# Patient Record
Sex: FEMALE | Race: BLACK OR AFRICAN AMERICAN | NOT HISPANIC OR LATINO | Employment: PART TIME | URBAN - METROPOLITAN AREA
[De-identification: names, ages, dates, MRNs, and addresses within clinical notes are randomized per-mention and may not be internally consistent; named-entity substitution may affect disease eponyms.]

---

## 2020-07-09 ENCOUNTER — LAB VISIT (OUTPATIENT)
Dept: PRIMARY CARE CLINIC | Facility: OTHER | Age: 50
End: 2020-07-09
Attending: INTERNAL MEDICINE
Payer: MEDICAID

## 2020-07-09 DIAGNOSIS — Z03.818 ENCOUNTER FOR OBSERVATION FOR SUSPECTED EXPOSURE TO OTHER BIOLOGICAL AGENTS RULED OUT: ICD-10-CM

## 2020-07-09 PROCEDURE — U0003 INFECTIOUS AGENT DETECTION BY NUCLEIC ACID (DNA OR RNA); SEVERE ACUTE RESPIRATORY SYNDROME CORONAVIRUS 2 (SARS-COV-2) (CORONAVIRUS DISEASE [COVID-19]), AMPLIFIED PROBE TECHNIQUE, MAKING USE OF HIGH THROUGHPUT TECHNOLOGIES AS DESCRIBED BY CMS-2020-01-R: HCPCS

## 2020-07-12 LAB — SARS-COV-2 RNA RESP QL NAA+PROBE: NEGATIVE

## 2022-10-25 ENCOUNTER — OFFICE VISIT (OUTPATIENT)
Dept: URGENT CARE | Facility: CLINIC | Age: 52
End: 2022-10-25
Payer: MEDICAID

## 2022-10-25 ENCOUNTER — HOSPITAL ENCOUNTER (OUTPATIENT)
Dept: RADIOLOGY | Facility: HOSPITAL | Age: 52
Discharge: HOME | End: 2022-10-25
Attending: NURSE PRACTITIONER
Payer: MEDICAID

## 2022-10-25 VITALS
WEIGHT: 197.56 LBS | TEMPERATURE: 98 F | BODY MASS INDEX: 35 KG/M2 | RESPIRATION RATE: 20 BRPM | DIASTOLIC BLOOD PRESSURE: 90 MMHG | HEART RATE: 60 BPM | OXYGEN SATURATION: 99 % | SYSTOLIC BLOOD PRESSURE: 171 MMHG | HEIGHT: 63 IN
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
## 2022-10-25 DIAGNOSIS — R07.89 CHEST WALL PAIN: Primary | ICD-10-CM

## 2022-10-25 PROBLEM — E11.9 TYPE 2 DIABETES MELLITUS, WITHOUT LONG-TERM CURRENT USE OF INSULIN: Status: ACTIVE | Noted: 2022-10-25

## 2022-10-25 PROBLEM — G56.03 CARPAL TUNNEL SYNDROME, BILATERAL: Status: ACTIVE | Noted: 2021-04-13

## 2022-10-25 PROBLEM — I10 HTN (HYPERTENSION): Status: ACTIVE | Noted: 2022-10-25

## 2022-10-25 PROBLEM — E78.5 HYPERLIPIDEMIA: Status: ACTIVE | Noted: 2022-10-25

## 2022-10-25 PROCEDURE — 99215 OFFICE O/P EST HI 40 MIN: CPT | Mod: PBBFAC | Performed by: NURSE PRACTITIONER

## 2022-10-25 PROCEDURE — 99214 OFFICE O/P EST MOD 30 MIN: CPT | Mod: S$PBB,,, | Performed by: NURSE PRACTITIONER

## 2022-10-25 PROCEDURE — 71101 X-RAY EXAM UNILAT RIBS/CHEST: CPT | Mod: TC,RT

## 2022-10-25 PROCEDURE — 99214 PR OFFICE/OUTPT VISIT, EST, LEVL IV, 30-39 MIN: ICD-10-PCS | Mod: S$PBB,,, | Performed by: NURSE PRACTITIONER

## 2022-10-25 RX ORDER — TRAMADOL HYDROCHLORIDE 50 MG/1
25 TABLET ORAL EVERY 12 HOURS PRN
Qty: 14 TABLET | Refills: 0 | Status: SHIPPED | OUTPATIENT
Start: 2022-10-25 | End: 2022-11-01

## 2022-10-25 RX ORDER — ATORVASTATIN CALCIUM 40 MG/1
40 TABLET, FILM COATED ORAL NIGHTLY
COMMUNITY
Start: 2022-10-06 | End: 2023-05-02 | Stop reason: SDUPTHER

## 2022-10-25 RX ORDER — DEXTROSE 4 G
TABLET,CHEWABLE ORAL
COMMUNITY
End: 2024-01-19 | Stop reason: SDUPTHER

## 2022-10-25 RX ORDER — DICLOFENAC SODIUM 50 MG/1
50 TABLET, DELAYED RELEASE ORAL 2 TIMES DAILY PRN
Qty: 20 TABLET | Refills: 0 | Status: SHIPPED | OUTPATIENT
Start: 2022-10-25 | End: 2022-11-04

## 2022-10-25 RX ORDER — AMLODIPINE BESYLATE 10 MG/1
10 TABLET ORAL DAILY
COMMUNITY
Start: 2022-10-06 | End: 2023-05-02 | Stop reason: SDUPTHER

## 2022-10-25 RX ORDER — METFORMIN HYDROCHLORIDE 500 MG/1
500 TABLET ORAL 2 TIMES DAILY WITH MEALS
COMMUNITY
End: 2023-05-02 | Stop reason: SDUPTHER

## 2022-10-25 NOTE — PROGRESS NOTES
"Subjective:       Patient ID: Traci Petersen is a 52 y.o. female.    Vitals:  height is 5' 2.99" (1.6 m) and weight is 89.6 kg (197 lb 8.5 oz). Her temperature is 98.1 °F (36.7 °C). Her blood pressure is 171/90 (abnormal) and her pulse is 60. Her respiration is 20 and oxygen saturation is 99%.     Chief Complaint: Fall (On sat, c/o rt rib pain) and Rib Injury    HPI as stated in CC. States fell on a sidewalk, on concrete.     Skin:  Negative for erythema.     Objective:      Physical Exam   Constitutional: She is oriented to person, place, and time.      Comments:Appears to be intoxicated.   obesity  HENT:   Head: Normocephalic and atraumatic.   Nose: No rhinorrhea or congestion.   Eyes: Right conjunctiva is injected. Left conjunctiva is injected.      Comments: Drowsy-appearing.   Neck: Neck supple.   Pulmonary/Chest: Effort normal and breath sounds normal. She exhibits tenderness.   Abdominal: Normal appearance.   Musculoskeletal:         General: Tenderness and signs of injury present. No swelling or deformity.   Neurological: She is oriented to person, place, and time.   Skin: No bruising and No erythema   Psychiatric: Her speech is slurred. She is slowed. She exhibits abnormal recent memory.      Comments: Same questions have to be asked multiple times to elicit a response. The patient told a very confusing story about "the clinic on [address]" and "the ER told me to go to 3 and then go to 4 and then go to urgent care." She told this story 3 times. She had no head injury during her fall.  She is inattentive.   Vitals reviewed.      Assessment:       1. Chest wall pain    2. Fall, initial encounter        XR RIB RIGHT W/ PA CHEST    Result Date: 10/25/2022  EXAMINATION XR RIBS MIN 3 VIEWS W/ PA CHEST RIGHT   CLINICAL HISTORY fall 3 days ago onto right side of chest.; Other chest pain   TECHNIQUE A total of 4 images of the ribs, submitted with PA view of the chest.   COMPARISON None available at the " time of initial interpretation.   FINDINGS Lines/tubes/devices: none present The cardiomediastinal silhouette and central pulmonary vasculature are unremarkable.  The trachea is midline. There is no lobar consolidation, pleural effusion, or pneumothorax. Detection of subtle/nondisplaced rib fracture is limited by imaging technique and the absence of radiopaque marker to indicate area of pain localization.  Within limitations, no convincing displaced rib abnormality is appreciated. The remaining osseous structures are grossly intact. IMPRESSION 1. No acute cardiopulmonary abnormality. 2. No displaced rib fracture.   Electronically signed by: Juanito Park   Date:    10/25/2022   Time:    15:39       Plan:         Chest wall pain  -     XR RIB RIGHT W/ PA CHEST    Fall, initial encounter  -     XR RIB RIGHT W/ PA CHEST     You do not have a broken rib/s.    Try to do 10 deep breaths at a time, once per hour while you are awake.    You can use a small pillow or stuffed animal to splint your ribs if you need to cough.    Take Tylenol every day if you need it.    Tramadol is a stronger pain medication you can take, to help you sleep at night.  Start with 1/2 a tablet and see if this relieves your pain. If not, take a whole tablet. Do not take this medication on an empty stomach. Take with food and water.    If at any time during the next 1 to 6 weeks you get a productive cough, upper back pain, fevers, fatigue, shortness of breath: please go in to be seen with your regular provider, at an Urgent Care or the ER.

## 2022-10-25 NOTE — PATIENT INSTRUCTIONS
You do not have a broken rib/s.    Try to do 10 deep breaths at a time, once per hour while you are awake.    You can use a small pillow or stuffed animal to splint your ribs if you need to cough.    Take Tylenol every day if you need it.    Tramadol is a stronger pain medication you can take, to help you sleep at night.  Start with 1/2 a tablet and see if this relieves your pain. If not, take a whole tablet. Do not take this medication on an empty stomach. Take with food and water.    If at any time during the next 1 to 6 weeks you get a productive cough, upper back pain, fevers, fatigue, shortness of breath: please go in to be seen with your regular provider, at an Urgent Care or the ER.

## 2022-10-25 NOTE — LETTER
October 25, 2022      [facility]  [address] [address]  Phone: [phone]       Patient: Traci Petersen   YOB: 1970  Date of Visit: 10/25/2022    To Whom It May Concern:    Davide Petersen  was at [facility] on 10/25/2022. The patient may return to work/school on 10/27/22 with no restrictions. If you have any questions or concerns, or if I can be of further assistance, please do not hesitate to contact me.    Sincerely,    CRISTINO Eugene

## 2022-11-02 ENCOUNTER — TELEPHONE (OUTPATIENT)
Dept: URGENT CARE | Facility: CLINIC | Age: 52
End: 2022-11-02
Payer: MEDICAID

## 2022-11-02 NOTE — TELEPHONE ENCOUNTER
----- Message from Jeannette Mccurdy sent at 10/26/2022 10:31 AM CDT -----  Regarding: Need Med Auth  Patient called stating that she needed a pre authorization for medication prescribed by [facility].  [phone]

## 2023-01-19 LAB — BCS RECOMMENDATION EXT: NORMAL

## 2023-03-29 ENCOUNTER — OFFICE VISIT (OUTPATIENT)
Dept: URGENT CARE | Facility: CLINIC | Age: 53
End: 2023-03-29
Payer: MEDICAID

## 2023-03-29 VITALS
SYSTOLIC BLOOD PRESSURE: 167 MMHG | WEIGHT: 187.38 LBS | RESPIRATION RATE: 16 BRPM | TEMPERATURE: 98 F | HEIGHT: 63 IN | OXYGEN SATURATION: 100 % | BODY MASS INDEX: 33.2 KG/M2 | DIASTOLIC BLOOD PRESSURE: 87 MMHG | HEART RATE: 63 BPM

## 2023-03-29 DIAGNOSIS — G56.01 CARPAL TUNNEL SYNDROME OF RIGHT WRIST: ICD-10-CM

## 2023-03-29 DIAGNOSIS — Z76.89 REFERRAL OF PATIENT: Primary | ICD-10-CM

## 2023-03-29 PROCEDURE — 99213 PR OFFICE/OUTPT VISIT, EST, LEVL III, 20-29 MIN: ICD-10-PCS | Mod: S$PBB,,,

## 2023-03-29 PROCEDURE — 99215 OFFICE O/P EST HI 40 MIN: CPT | Mod: PBBFAC

## 2023-03-29 PROCEDURE — 99213 OFFICE O/P EST LOW 20 MIN: CPT | Mod: S$PBB,,,

## 2023-03-29 RX ORDER — SERTRALINE HYDROCHLORIDE 50 MG/1
50 TABLET, FILM COATED ORAL
COMMUNITY
Start: 2023-02-08 | End: 2023-05-02

## 2023-03-29 RX ORDER — DICLOFENAC SODIUM 75 MG/1
75 TABLET, DELAYED RELEASE ORAL 2 TIMES DAILY
Qty: 30 TABLET | Refills: 0 | Status: SHIPPED | OUTPATIENT
Start: 2023-03-29 | End: 2023-05-02

## 2023-03-29 NOTE — PROGRESS NOTES
"Subjective:      Patient ID: Traci Petersen is a 52 y.o. female.    Vitals:  height is 5' 2.99" (1.6 m) and weight is 85 kg (187 lb 6.4 oz). Her temperature is 97.7 °F (36.5 °C). Her blood pressure is 167/87 (abnormal) and her pulse is 63. Her respiration is 16 and oxygen saturation is 100%.     Chief Complaint: Rt hand/Lt foot pain (Hx of carpal tunnel, needs new PCP. Foot pain x ~ 1 week, no injury.) and Referral (Needs referral for PCP)    Rt hand pain from carpal tunnel. Hx of sx for carpal tunnel, currently wearing a brace. L foot pain. Denies injury. States worse in the morning when getting out of bed. Needs a referral.      Constitution: Negative.   HENT: Negative.     Neck: neck negative.   Cardiovascular: Negative.    Eyes: Negative.    Respiratory: Negative.     Gastrointestinal: Negative.    Genitourinary: Negative.    Musculoskeletal:  Positive for pain.   Skin: Negative.    Neurological: Negative.     Objective:     Physical Exam   HENT:   Head: Normocephalic.   Mouth/Throat: Mucous membranes are moist. Oropharynx is clear.   Eyes: Pupils are equal, round, and reactive to light.   Cardiovascular: Normal rate and normal pulses.   Pulmonary/Chest: Effort normal.   Abdominal: Normal appearance. Soft.   Musculoskeletal: Normal range of motion.         General: Tenderness present. Normal range of motion.      Right hand: She exhibits tenderness.      Left hand: Normal.      Right foot: Normal.      Left foot: Tenderness present.        Feet:    Neurological: She is alert.   Skin: Skin is warm and dry.   Vitals reviewed.    Assessment:     1. Referral of patient    2. Carpal tunnel syndrome of right wrist        Plan:       Referral of patient  -     Ambulatory referral/consult to Family Practice    Carpal tunnel syndrome of right wrist  -     diclofenac (VOLTAREN) 75 MG EC tablet; Take 1 tablet (75 mg total) by mouth 2 (two) times daily.  Dispense: 30 tablet; Refill: 0    ER precautions given, " patient verbalized understanding.     Please see provided patient education for guidance.    Follow up with PCP or return to clinic if symptoms worsen or do not improve.

## 2023-05-02 ENCOUNTER — HOSPITAL ENCOUNTER (OUTPATIENT)
Dept: RADIOLOGY | Facility: HOSPITAL | Age: 53
Discharge: HOME | End: 2023-05-02
Attending: NURSE PRACTITIONER
Payer: MEDICAID

## 2023-05-02 ENCOUNTER — OFFICE VISIT (OUTPATIENT)
Dept: FAMILY MEDICINE | Facility: CLINIC | Age: 53
End: 2023-05-02
Payer: MEDICAID

## 2023-05-02 VITALS
WEIGHT: 185.5 LBS | TEMPERATURE: 98 F | RESPIRATION RATE: 20 BRPM | HEART RATE: 60 BPM | HEIGHT: 62 IN | BODY MASS INDEX: 34.14 KG/M2 | OXYGEN SATURATION: 99 % | DIASTOLIC BLOOD PRESSURE: 78 MMHG | SYSTOLIC BLOOD PRESSURE: 148 MMHG

## 2023-05-02 DIAGNOSIS — I10 HYPERTENSION, UNSPECIFIED TYPE: Primary | ICD-10-CM

## 2023-05-02 DIAGNOSIS — E11.69 TYPE 2 DIABETES MELLITUS WITH OTHER SPECIFIED COMPLICATION, WITHOUT LONG-TERM CURRENT USE OF INSULIN: ICD-10-CM

## 2023-05-02 DIAGNOSIS — G47.00 INSOMNIA, UNSPECIFIED TYPE: ICD-10-CM

## 2023-05-02 DIAGNOSIS — Z12.11 ENCOUNTER FOR SCREENING FOR MALIGNANT NEOPLASM OF COLON: ICD-10-CM

## 2023-05-02 DIAGNOSIS — E78.5 HYPERLIPIDEMIA, UNSPECIFIED HYPERLIPIDEMIA TYPE: ICD-10-CM

## 2023-05-02 DIAGNOSIS — F41.9 ANXIETY: ICD-10-CM

## 2023-05-02 DIAGNOSIS — G56.03 CARPAL TUNNEL SYNDROME, BILATERAL: ICD-10-CM

## 2023-05-02 DIAGNOSIS — Z12.31 ENCOUNTER FOR SCREENING MAMMOGRAM FOR MALIGNANT NEOPLASM OF BREAST: ICD-10-CM

## 2023-05-02 LAB
ALBUMIN SERPL-MCNC: 3.9 G/DL (ref 3.5–5)
ALBUMIN/GLOB SERPL: 1.1 RATIO (ref 1.1–2)
ALP SERPL-CCNC: 74 UNIT/L (ref 40–150)
ALT SERPL-CCNC: 12 UNIT/L (ref 0–55)
APPEARANCE UR: CLEAR
AST SERPL-CCNC: 16 UNIT/L (ref 5–34)
BACTERIA #/AREA URNS AUTO: ABNORMAL /HPF
BASOPHILS # BLD AUTO: 0.03 X10(3)/MCL
BASOPHILS NFR BLD AUTO: 0.5 %
BILIRUB UR QL STRIP.AUTO: NEGATIVE MG/DL
BILIRUBIN DIRECT+TOT PNL SERPL-MCNC: 0.8 MG/DL
BUN SERPL-MCNC: 9.3 MG/DL (ref 9.8–20.1)
CALCIUM SERPL-MCNC: 9.3 MG/DL (ref 8.4–10.2)
CHLORIDE SERPL-SCNC: 105 MMOL/L (ref 98–107)
CHOLEST SERPL-MCNC: 186 MG/DL
CHOLEST/HDLC SERPL: 3 {RATIO} (ref 0–5)
CO2 SERPL-SCNC: 23 MMOL/L (ref 22–29)
COLOR UR AUTO: ABNORMAL
CREAT SERPL-MCNC: 0.74 MG/DL (ref 0.55–1.02)
CREAT UR-MCNC: 169.1 MG/DL (ref 47–110)
DEPRECATED CALCIDIOL+CALCIFEROL SERPL-MC: 14.9 NG/ML (ref 30–80)
EOSINOPHIL # BLD AUTO: 0.08 X10(3)/MCL (ref 0–0.9)
EOSINOPHIL NFR BLD AUTO: 1.4 %
ERYTHROCYTE [DISTWIDTH] IN BLOOD BY AUTOMATED COUNT: 12.3 % (ref 11.5–17)
EST. AVERAGE GLUCOSE BLD GHB EST-MCNC: 125.5 MG/DL
GFR SERPLBLD CREATININE-BSD FMLA CKD-EPI: >60 MLS/MIN/1.73/M2
GLOBULIN SER-MCNC: 3.7 GM/DL (ref 2.4–3.5)
GLUCOSE SERPL-MCNC: 143 MG/DL (ref 74–100)
GLUCOSE UR QL STRIP.AUTO: NORMAL MG/DL
HAV IGM SERPL QL IA: NONREACTIVE
HBA1C MFR BLD: 6 %
HBV CORE IGM SERPL QL IA: NONREACTIVE
HBV SURFACE AG SERPL QL IA: NONREACTIVE
HCT VFR BLD AUTO: 42.9 % (ref 37–47)
HCV AB SERPL QL IA: NONREACTIVE
HDLC SERPL-MCNC: 66 MG/DL (ref 35–60)
HGB BLD-MCNC: 13.4 G/DL (ref 12–16)
HIV 1+2 AB+HIV1 P24 AG SERPL QL IA: NONREACTIVE
HYALINE CASTS #/AREA URNS LPF: ABNORMAL /LPF
IMM GRANULOCYTES # BLD AUTO: 0 X10(3)/MCL (ref 0–0.04)
IMM GRANULOCYTES NFR BLD AUTO: 0 %
KETONES UR QL STRIP.AUTO: NEGATIVE MG/DL
LDLC SERPL CALC-MCNC: 99 MG/DL (ref 50–140)
LEUKOCYTE ESTERASE UR QL STRIP.AUTO: 75 UNIT/L
LYMPHOCYTES # BLD AUTO: 3.27 X10(3)/MCL (ref 0.6–4.6)
LYMPHOCYTES NFR BLD AUTO: 57.5 %
MCH RBC QN AUTO: 26.3 PG (ref 27–31)
MCHC RBC AUTO-ENTMCNC: 31.2 G/DL (ref 33–36)
MCV RBC AUTO: 84.1 FL (ref 80–94)
MICROALBUMIN UR-MCNC: 17.2 UG/ML
MICROALBUMIN/CREAT RATIO PNL UR: 10.2 MG/GM CR (ref 0–30)
MONOCYTES # BLD AUTO: 0.31 X10(3)/MCL (ref 0.1–1.3)
MONOCYTES NFR BLD AUTO: 5.4 %
MUCOUS THREADS URNS QL MICRO: ABNORMAL /LPF
NEUTROPHILS # BLD AUTO: 2 X10(3)/MCL (ref 2.1–9.2)
NEUTROPHILS NFR BLD AUTO: 35.2 %
NITRITE UR QL STRIP.AUTO: NEGATIVE
NRBC BLD AUTO-RTO: 0 %
PH UR STRIP.AUTO: 6 [PH]
PLATELET # BLD AUTO: 395 X10(3)/MCL (ref 130–400)
PMV BLD AUTO: 9.2 FL (ref 7.4–10.4)
POTASSIUM SERPL-SCNC: 3.7 MMOL/L (ref 3.5–5.1)
PROT SERPL-MCNC: 7.6 GM/DL (ref 6.4–8.3)
PROT UR QL STRIP.AUTO: ABNORMAL MG/DL
RBC # BLD AUTO: 5.1 X10(6)/MCL (ref 4.2–5.4)
RBC #/AREA URNS AUTO: ABNORMAL /HPF
RBC UR QL AUTO: ABNORMAL UNIT/L
SODIUM SERPL-SCNC: 138 MMOL/L (ref 136–145)
SP GR UR STRIP.AUTO: 1.02
SQUAMOUS #/AREA URNS LPF: ABNORMAL /HPF
T PALLIDUM AB SER QL: NONREACTIVE
T4 FREE SERPL-MCNC: 0.92 NG/DL (ref 0.7–1.48)
TRIGL SERPL-MCNC: 103 MG/DL (ref 37–140)
TSH SERPL-ACNC: 2.54 UIU/ML (ref 0.35–4.94)
UROBILINOGEN UR STRIP-ACNC: NORMAL MG/DL
VIT B12 SERPL-MCNC: 417 PG/ML (ref 213–816)
VLDLC SERPL CALC-MCNC: 21 MG/DL
WBC # SPEC AUTO: 5.69 X10(3)/MCL (ref 4.5–11.5)
WBC #/AREA URNS AUTO: ABNORMAL /HPF

## 2023-05-02 PROCEDURE — 3078F PR MOST RECENT DIASTOLIC BLOOD PRESSURE < 80 MM HG: ICD-10-PCS | Mod: CPTII,,, | Performed by: NURSE PRACTITIONER

## 2023-05-02 PROCEDURE — 80053 COMPREHEN METABOLIC PANEL: CPT | Performed by: NURSE PRACTITIONER

## 2023-05-02 PROCEDURE — 82306 VITAMIN D 25 HYDROXY: CPT | Performed by: NURSE PRACTITIONER

## 2023-05-02 PROCEDURE — 81001 URINALYSIS AUTO W/SCOPE: CPT | Performed by: NURSE PRACTITIONER

## 2023-05-02 PROCEDURE — 84439 ASSAY OF FREE THYROXINE: CPT | Performed by: NURSE PRACTITIONER

## 2023-05-02 PROCEDURE — 84443 ASSAY THYROID STIM HORMONE: CPT | Performed by: NURSE PRACTITIONER

## 2023-05-02 PROCEDURE — 36415 COLL VENOUS BLD VENIPUNCTURE: CPT | Performed by: NURSE PRACTITIONER

## 2023-05-02 PROCEDURE — 1160F PR REVIEW ALL MEDS BY PRESCRIBER/CLIN PHARMACIST DOCUMENTED: ICD-10-PCS | Mod: CPTII,,, | Performed by: NURSE PRACTITIONER

## 2023-05-02 PROCEDURE — 85025 COMPLETE CBC W/AUTO DIFF WBC: CPT | Performed by: NURSE PRACTITIONER

## 2023-05-02 PROCEDURE — 3077F SYST BP >= 140 MM HG: CPT | Mod: CPTII,,, | Performed by: NURSE PRACTITIONER

## 2023-05-02 PROCEDURE — 99204 OFFICE O/P NEW MOD 45 MIN: CPT | Mod: S$PBB,,, | Performed by: NURSE PRACTITIONER

## 2023-05-02 PROCEDURE — 99215 OFFICE O/P EST HI 40 MIN: CPT | Mod: PBBFAC,PN | Performed by: NURSE PRACTITIONER

## 2023-05-02 PROCEDURE — 82607 VITAMIN B-12: CPT | Performed by: NURSE PRACTITIONER

## 2023-05-02 PROCEDURE — 1159F PR MEDICATION LIST DOCUMENTED IN MEDICAL RECORD: ICD-10-PCS | Mod: CPTII,,, | Performed by: NURSE PRACTITIONER

## 2023-05-02 PROCEDURE — 80061 LIPID PANEL: CPT | Performed by: NURSE PRACTITIONER

## 2023-05-02 PROCEDURE — 1160F RVW MEDS BY RX/DR IN RCRD: CPT | Mod: CPTII,,, | Performed by: NURSE PRACTITIONER

## 2023-05-02 PROCEDURE — 99204 PR OFFICE/OUTPT VISIT, NEW, LEVL IV, 45-59 MIN: ICD-10-PCS | Mod: S$PBB,,, | Performed by: NURSE PRACTITIONER

## 2023-05-02 PROCEDURE — 3078F DIAST BP <80 MM HG: CPT | Mod: CPTII,,, | Performed by: NURSE PRACTITIONER

## 2023-05-02 PROCEDURE — 83036 HEMOGLOBIN GLYCOSYLATED A1C: CPT | Performed by: NURSE PRACTITIONER

## 2023-05-02 PROCEDURE — 3008F BODY MASS INDEX DOCD: CPT | Mod: CPTII,,, | Performed by: NURSE PRACTITIONER

## 2023-05-02 PROCEDURE — 3077F PR MOST RECENT SYSTOLIC BLOOD PRESSURE >= 140 MM HG: ICD-10-PCS | Mod: CPTII,,, | Performed by: NURSE PRACTITIONER

## 2023-05-02 PROCEDURE — 87389 HIV-1 AG W/HIV-1&-2 AB AG IA: CPT | Performed by: NURSE PRACTITIONER

## 2023-05-02 PROCEDURE — 82043 UR ALBUMIN QUANTITATIVE: CPT | Performed by: NURSE PRACTITIONER

## 2023-05-02 PROCEDURE — 80074 ACUTE HEPATITIS PANEL: CPT | Performed by: NURSE PRACTITIONER

## 2023-05-02 PROCEDURE — 3008F PR BODY MASS INDEX (BMI) DOCUMENTED: ICD-10-PCS | Mod: CPTII,,, | Performed by: NURSE PRACTITIONER

## 2023-05-02 PROCEDURE — 73110 X-RAY EXAM OF WRIST: CPT | Mod: TC,PN,LT

## 2023-05-02 PROCEDURE — 1159F MED LIST DOCD IN RCRD: CPT | Mod: CPTII,,, | Performed by: NURSE PRACTITIONER

## 2023-05-02 PROCEDURE — 86780 TREPONEMA PALLIDUM: CPT | Performed by: NURSE PRACTITIONER

## 2023-05-02 RX ORDER — ESCITALOPRAM OXALATE 5 MG/1
5 TABLET ORAL DAILY
Qty: 30 TABLET | Refills: 6 | Status: SHIPPED | OUTPATIENT
Start: 2023-05-02 | End: 2023-05-31

## 2023-05-02 RX ORDER — METFORMIN HYDROCHLORIDE 500 MG/1
500 TABLET ORAL 2 TIMES DAILY WITH MEALS
Qty: 180 TABLET | Refills: 1 | Status: SHIPPED | OUTPATIENT
Start: 2023-05-02 | End: 2023-10-03 | Stop reason: SDUPTHER

## 2023-05-02 RX ORDER — ATORVASTATIN CALCIUM 40 MG/1
40 TABLET, FILM COATED ORAL NIGHTLY
Qty: 90 TABLET | Refills: 1 | Status: SHIPPED | OUTPATIENT
Start: 2023-05-02 | End: 2023-10-03 | Stop reason: SDUPTHER

## 2023-05-02 RX ORDER — AMLODIPINE BESYLATE 10 MG/1
10 TABLET ORAL DAILY
Qty: 90 TABLET | Refills: 1 | Status: SHIPPED | OUTPATIENT
Start: 2023-05-02 | End: 2023-10-03 | Stop reason: SDUPTHER

## 2023-05-02 RX ORDER — MELOXICAM 15 MG/1
15 TABLET ORAL DAILY
Qty: 30 TABLET | Refills: 2 | Status: SHIPPED | OUTPATIENT
Start: 2023-05-02 | End: 2023-06-30

## 2023-05-02 NOTE — ASSESSMENT & PLAN NOTE
Chronic, worsening, failed Zoloft treatment  Trial Lexapro 5mg po daily and return in 2 weeks by virtual audio for med assessment  Practice deep breathing or abdominal breathing exercises when anxiety occurs.  Exercise daily. Get sunlight daily.  Avoid caffeine, alcohol and stimulants.  Practice positive phrases and repeat throughout the day, yoga, lavender scents or Chamomile tea will help anxiety.  Set healthy boundaries, avoid people and conversations that increase stress.  Reports any symptoms of suicidal or homicidal ideations immediately, if clinic is closed go to nearest emergency room.

## 2023-05-02 NOTE — ASSESSMENT & PLAN NOTE
Will try Melatonin and Lexapro to treat anxiety and see if this resolves problem.  Avoid caffeine, alcohol and stimulants. Do not use illicit drugs.  Practice positive phrases and repeat throughout the day.  Try yoga, lavender scents or Chamomile tea to promote relaxation.  Set healthy boundaries, avoid people and conversations that increase stress.  Avoid caffeinated beverages after lunch  Avoid alcohol near bedtime (eg, late afternoon and evening)  Avoid smoking or other nicotine intake, particularly during the evening  Exercise regularly for at least 20 minutes, preferably more than four to five hours prior to bedtime  Avoid daytime naps, especially if they are longer than 20 to 30 minutes or occur late in the day  Resolve concerns or worries before bedtime  Try not to force sleep    Sleep Hygiene Techniques: Sleep hygiene refers to actions that tend to improve and maintain good sleep  Power down electronic devices at least one hour prior to bedtime.  Keep room dark; use eye mask or relaxation sound machine to promote rest.  Sleep as long as necessary to feel rested (usually seven to eight hours for adults) and then get out of bed  Maintain a regular sleep schedule, particularly a regular wake-up time in the morning

## 2023-05-02 NOTE — ASSESSMENT & PLAN NOTE
Encouraged ACE/ARB/Statin according to guidelines.  Follow ADA Diet. Avoid soda, simple sweets, and limit rice/pasta/breads/starches.  Maintain healthy weight with goal BMI <30. Exercise 5 times per week for 30 minutes per day.  Stressed importance of daily foot exams.  Stressed importance of annual dilated eye exam.  Last foot exam: 5/2/23  Last eye exam: 5/2/23  Last micro albumin: 5/2/23

## 2023-05-02 NOTE — ASSESSMENT & PLAN NOTE
Chronic, worsening, out of meds. Refilled Amlodipine 10mg po qhs today, resume home meds  Low Sodium Diet (Dash Diet - less than 2 grams of sodium per day).  Monitor Blood Pressure daily and log. Report any consistent numbers greater than 140/90.  Smoking Cessation encouraged to aid in BP reduction.  Maintain healthy weight with goal BMI <30. Exercise 30 minutes per day 5 days per week

## 2023-05-02 NOTE — ASSESSMENT & PLAN NOTE
Chronic problem, FLP today  Restart Lipitor 40mg po qhs today  Stressed importance of dietary modifications. Follow a low cholesterol, low saturated fat diet with less that 200mg of cholesterol a day.  Avoid fried foods and high saturated fats (high saturated fats less than 7% of calories).  Add Flax Seed/Fish Oil supplements to diet. Increase dietary fiber.  Regular exercise can reduce LDL and raise HDL. Stressed importance of physical activity 5 times per week for 30 minutes per day.

## 2023-05-02 NOTE — PROGRESS NOTES
Patient Name: Traci Petersen   : 1970  MRN: [id number]     SUBJECTIVE DATA:    CHIEF COMPLAINT:  Traci Petersen  is a 52 y.o. female presenting to clinic today for Establish Care (Est pcp, fasting, carpal tunnel)      HPI:  23:  51 y/o BF presents to clinic to establish care today, moving from [place].  PMH, bilateral carpal tunnel, HTN, HLD, Type 2 DM.   Bilateral carpal tunnel:  She is seeing ortho there for bilateral carpal tunnel, told to find Therapy here in [place].   She had surgery on right wrist for carpal tunnel release 1 year ago and is still having pain/issues.  Left wrist also painful and has not had any intervention on this one yet.  .  She was on Diclofenac for her wrist and it did not help according to her. She has not tried any other NSAIDs.  She has had CSI injections in past on right wrist but nothing done on left. There are no xrays in system on let wrist either.  Pt is requesting a referral to Ortho.    c/o Foot pain left anterior foot and under foot: New problem.  She is asking for podiatrist, had appt in [place] but missed it. Encouraged to reschedule as we do not have resources in [place] for this specialty.  Hx type 2 DM, unsure of control.     Insomnia: has not tried any meds for this.  Believes it stems from Anxiety which is not well-treated.  Is not currently on medications for anxiety.     Anxiety: has tried and failed Zoloft only.  Is amenable to trying Lexapro.  She is not on Zoloft any longer and states it did not work for her anxiety.      Type 2 DM:  unsure of last A1c or if she has had eye exam, microalbumin, foot exam.  She is only on Metformin 500mg po BID with meals currently.      HLD:  unsure of last FLP results.  She was on Lipitor but has been unable to obtain her medications due to lack of refills.      HTN: Has not been on Amlodipine since October last fill.  She has not been able to get her medications filled.  Today her BP is 148/78.   "    Wellness:  She had MMG in January and Cologard testing in January as well.  Pt refused Shingles vaccination today.        ROS:  Review of Systems   Musculoskeletal:  Positive for joint pain and myalgias.        Bilateral wrist pain  Foot pain left foot   Neurological: Negative.    Psychiatric/Behavioral:  The patient is nervous/anxious and has insomnia.    All other systems reviewed and are negative.    ALLERGIES:  Review of patient's allergies indicates:  No Known Allergies     HISTORY:  Past Medical History:   Diagnosis Date    Carpal tunnel syndrome     High cholesterol     HTN (hypertension)       Past Surgical History:   Procedure Laterality Date    CARPAL TUNNEL RELEASE Right     CHOLECYSTECTOMY      HYSTERECTOMY      TONSILLECTOMY      TUBAL LIGATION       Family History   Problem Relation Age of Onset    No Known Problems Mother     No Known Problems Father      Social History     Tobacco Use   Smoking Status Never   Smokeless Tobacco Never        OBJECTIVE DATA:  Vital signs  Vitals:    05/02/23 0805 05/02/23 0807   BP: (!) 160/86 (!) 148/78   BP Location: Left arm Left arm   Patient Position: Sitting Sitting   BP Method: Large (Automatic) Large (Manual)   Pulse: 60    Resp: 20    Temp: 98 °F (36.7 °C)    TempSrc: Oral    SpO2: 99%    Weight: 84.1 kg (185 lb 8 oz)    Height: 5' 2" (1.575 m)         Physical Exam  Constitutional:       General: She is awake.      Appearance: Normal appearance. She is well-developed.   HENT:      Head: Normocephalic and atraumatic.      Right Ear: Hearing, tympanic membrane, ear canal and external ear normal.      Left Ear: Hearing, tympanic membrane, ear canal and external ear normal.      Nose: Nose normal.      Mouth/Throat:      Lips: Pink.      Mouth: Mucous membranes are moist.      Pharynx: Oropharynx is clear. Uvula midline.   Eyes:      Pupils: Pupils are equal, round, and reactive to light.   Cardiovascular:      Rate and Rhythm: Normal rate and regular rhythm. "      Pulses: Normal pulses.      Heart sounds: Normal heart sounds.   Pulmonary:      Effort: Pulmonary effort is normal.      Breath sounds: Normal breath sounds.   Abdominal:      General: Abdomen is flat. Bowel sounds are normal.      Palpations: Abdomen is soft.   Musculoskeletal:         General: Normal range of motion.      Cervical back: Normal range of motion and neck supple.      Right foot: Normal range of motion. Tenderness present. No deformity or bunion.      Left foot: Normal range of motion. Tenderness present. No deformity or bunion.      Comments: Left foot dorsally and beneath foot with pain   Feet:      Right foot:      Protective Sensation: 10 sites tested.  10 sites sensed.      Skin integrity: Skin integrity normal. No ulcer, blister or skin breakdown.      Toenail Condition: Right toenails are normal.      Left foot:      Protective Sensation: 10 sites tested.  10 sites sensed.      Skin integrity: Skin integrity normal. No ulcer, blister or skin breakdown.      Toenail Condition: Left toenails are normal.   Skin:     General: Skin is warm and dry.      Capillary Refill: Capillary refill takes less than 2 seconds.   Neurological:      General: No focal deficit present.      Mental Status: She is alert, oriented to person, place, and time and easily aroused. Mental status is at baseline.   Psychiatric:         Mood and Affect: Mood normal.         Behavior: Behavior is cooperative.        ASSESSMENT/PLAN:  1. Hypertension, unspecified type  Overview:  BP Readings from Last 3 Encounters:   05/02/23 (!) 148/78   03/29/23 (!) 167/87   10/25/22 (!) 171/90         Assessment & Plan:  Chronic, worsening, out of meds. Refilled Amlodipine 10mg po qhs today, resume home meds  Low Sodium Diet (Dash Diet - less than 2 grams of sodium per day).  Monitor Blood Pressure daily and log. Report any consistent numbers greater than 140/90.  Smoking Cessation encouraged to aid in BP reduction.  Maintain healthy  weight with goal BMI <30. Exercise 30 minutes per day 5 days per week      Orders:  -     Lipid Panel  -     CBC Auto Differential  -     Comprehensive Metabolic Panel  -     Urinalysis  -     Hemoglobin A1C  -     Hemoglobin A1C, POCT  -     TSH  -     T4, Free  -     Vitamin D  -     Vitamin B12  -     Hepatitis Panel, Acute  -     HIV 1/2 Ag/Ab (4th Gen)  -     SYPHILIS ANTIBODY (WITH REFLEX RPR)  -     amLODIPine (NORVASC) 10 MG tablet; Take 1 tablet (10 mg total) by mouth once daily.  Dispense: 90 tablet; Refill: 1    2. Hyperlipidemia, unspecified hyperlipidemia type  Overview:  No results found for: CHOL  No results found for: HDL  No results found for: LDLCALC  No results found for: DLDL  No results found for: TRIG    f1 No results found for: CHOLHDL      Assessment & Plan:  Chronic problem, FLP today  Restart Lipitor 40mg po qhs today  Stressed importance of dietary modifications. Follow a low cholesterol, low saturated fat diet with less that 200mg of cholesterol a day.  Avoid fried foods and high saturated fats (high saturated fats less than 7% of calories).  Add Flax Seed/Fish Oil supplements to diet. Increase dietary fiber.  Regular exercise can reduce LDL and raise HDL. Stressed importance of physical activity 5 times per week for 30 minutes per day.           Orders:  -     Lipid Panel  -     CBC Auto Differential  -     Comprehensive Metabolic Panel  -     Urinalysis  -     Hemoglobin A1C  -     Hemoglobin A1C, POCT  -     TSH  -     T4, Free  -     Vitamin D  -     Vitamin B12  -     Hepatitis Panel, Acute  -     HIV 1/2 Ag/Ab (4th Gen)  -     SYPHILIS ANTIBODY (WITH REFLEX RPR)  -     atorvastatin (LIPITOR) 40 MG tablet; Take 1 tablet (40 mg total) by mouth every evening.  Dispense: 90 tablet; Refill: 1    3. Type 2 diabetes mellitus with other specified complication, without long-term current use of insulin  Overview:  No results found for: LABA1C, HGBA1C      Assessment & Plan:  Encouraged  ACE/ARB/Statin according to guidelines.  Follow ADA Diet. Avoid soda, simple sweets, and limit rice/pasta/breads/starches.  Maintain healthy weight with goal BMI <30. Exercise 5 times per week for 30 minutes per day.  Stressed importance of daily foot exams.  Stressed importance of annual dilated eye exam.  Last foot exam: 5/2/23  Last eye exam: 5/2/23  Last micro albumin: 5/2/23        Orders:  -     Lipid Panel  -     CBC Auto Differential  -     Comprehensive Metabolic Panel  -     Urinalysis  -     Hemoglobin A1C  -     Hemoglobin A1C, POCT  -     TSH  -     T4, Free  -     Vitamin D  -     Vitamin B12  -     Hepatitis Panel, Acute  -     HIV 1/2 Ag/Ab (4th Gen)  -     SYPHILIS ANTIBODY (WITH REFLEX RPR)  -     metFORMIN (GLUCOPHAGE) 500 MG tablet; Take 1 tablet (500 mg total) by mouth 2 (two) times daily with meals.  Dispense: 180 tablet; Refill: 1  -     Diabetic Eye Screening Photo  -     Hemoglobin A1C; Future; Expected date: 05/02/2023  -     Microalbumin/Creatinine Ratio, Urine; Future; Expected date: 05/02/2023    4. Encounter for screening for malignant neoplasm of colon  -     Cancel: Cologuard Screening (Multitarget Stool DNA); Future; Expected date: 05/02/2023    5. Encounter for screening mammogram for malignant neoplasm of breast  -     Cancel: Mammo Digital Screening Bilat; Future; Expected date: 05/02/2023    6. Carpal tunnel syndrome, bilateral  Overview:  Formatting of this note might be different from the original.  Added automatically from request for surgery [id number]    Assessment & Plan:  Refer to PT today  Left wrist xray  Referral to ortho  NSAIDs as prescribed      Orders:  -     Ambulatory referral/consult to Physical/Occupational Therapy; Future; Expected date: 05/09/2023  -     X-Ray Wrist Complete Left  -     meloxicam (MOBIC) 15 MG tablet; Take 1 tablet (15 mg total) by mouth once daily.  Dispense: 30 tablet; Refill: 2  -     Ambulatory referral/consult to Orthopedics; Future; Expected  date: 05/09/2023    7. Anxiety  Assessment & Plan:  Chronic, worsening, failed Zoloft treatment  Trial Lexapro 5mg po daily and return in 2 weeks by virtual audio for med assessment  Practice deep breathing or abdominal breathing exercises when anxiety occurs.  Exercise daily. Get sunlight daily.  Avoid caffeine, alcohol and stimulants.  Practice positive phrases and repeat throughout the day, yoga, lavender scents or Chamomile tea will help anxiety.  Set healthy boundaries, avoid people and conversations that increase stress.  Reports any symptoms of suicidal or homicidal ideations immediately, if clinic is closed go to nearest emergency room.        Orders:  -     EScitalopram oxalate (LEXAPRO) 5 MG Tab; Take 1 tablet (5 mg total) by mouth once daily.  Dispense: 30 tablet; Refill: 6    8. Insomnia, unspecified type  Assessment & Plan:  Will try Melatonin and Lexapro to treat anxiety and see if this resolves problem.  Avoid caffeine, alcohol and stimulants. Do not use illicit drugs.  Practice positive phrases and repeat throughout the day.  Try yoga, lavender scents or Chamomile tea to promote relaxation.  Set healthy boundaries, avoid people and conversations that increase stress.  Avoid caffeinated beverages after lunch  Avoid alcohol near bedtime (eg, late afternoon and evening)  Avoid smoking or other nicotine intake, particularly during the evening  Exercise regularly for at least 20 minutes, preferably more than four to five hours prior to bedtime  Avoid daytime naps, especially if they are longer than 20 to 30 minutes or occur late in the day  Resolve concerns or worries before bedtime  Try not to force sleep    Sleep Hygiene Techniques: Sleep hygiene refers to actions that tend to improve and maintain good sleep  Power down electronic devices at least one hour prior to bedtime.  Keep room dark; use eye mask or relaxation sound machine to promote rest.  Sleep as long as necessary to feel rested (usually  seven to eight hours for adults) and then get out of bed  Maintain a regular sleep schedule, particularly a regular wake-up time in the morning              No results found for this or any previous visit (from the past 1008 hour(s)).        Follow Up:  Follow up in about 2 weeks (around 5/16/2023) for Review of labs, Virtual Visit.             This note was created with the assistance of a voice recognition software or phone dictation. There may be transcription errors as a result of using this technology however minimal. Effort has been made to assure accuracy of transcription but any obvious errors or omissions should be clarified with the author of the document

## 2023-05-03 LAB — PATH REV: NORMAL

## 2023-05-04 ENCOUNTER — TELEPHONE (OUTPATIENT)
Dept: FAMILY MEDICINE | Facility: CLINIC | Age: 53
End: 2023-05-04
Payer: MEDICAID

## 2023-05-04 NOTE — TELEPHONE ENCOUNTER
----- Message from CRISTINO Diaz sent at 5/2/2023  2:22 PM CDT -----  Xray in clinic on wrist was normal.

## 2023-05-31 ENCOUNTER — OFFICE VISIT (OUTPATIENT)
Dept: FAMILY MEDICINE | Facility: CLINIC | Age: 53
End: 2023-05-31
Payer: MEDICAID

## 2023-05-31 VITALS
HEIGHT: 62 IN | HEART RATE: 60 BPM | RESPIRATION RATE: 20 BRPM | BODY MASS INDEX: 33.99 KG/M2 | TEMPERATURE: 98 F | DIASTOLIC BLOOD PRESSURE: 74 MMHG | WEIGHT: 184.69 LBS | SYSTOLIC BLOOD PRESSURE: 154 MMHG

## 2023-05-31 DIAGNOSIS — F51.05 INSOMNIA DUE TO OTHER MENTAL DISORDER: ICD-10-CM

## 2023-05-31 DIAGNOSIS — I10 PRIMARY HYPERTENSION: ICD-10-CM

## 2023-05-31 DIAGNOSIS — E11.69 TYPE 2 DIABETES MELLITUS WITH OTHER SPECIFIED COMPLICATION, WITHOUT LONG-TERM CURRENT USE OF INSULIN: ICD-10-CM

## 2023-05-31 DIAGNOSIS — E55.9 VITAMIN D DEFICIENCY: Primary | ICD-10-CM

## 2023-05-31 DIAGNOSIS — G56.03 CARPAL TUNNEL SYNDROME, BILATERAL: ICD-10-CM

## 2023-05-31 DIAGNOSIS — R31.9 HEMATURIA, UNSPECIFIED TYPE: ICD-10-CM

## 2023-05-31 DIAGNOSIS — F41.9 ANXIETY: ICD-10-CM

## 2023-05-31 DIAGNOSIS — F99 INSOMNIA DUE TO OTHER MENTAL DISORDER: ICD-10-CM

## 2023-05-31 DIAGNOSIS — E78.2 MIXED HYPERLIPIDEMIA: ICD-10-CM

## 2023-05-31 LAB
APPEARANCE UR: CLEAR
BACTERIA #/AREA URNS AUTO: ABNORMAL /HPF
BILIRUB UR QL STRIP.AUTO: NEGATIVE MG/DL
COLOR UR: ABNORMAL
GLUCOSE UR QL STRIP.AUTO: NORMAL MG/DL
HYALINE CASTS #/AREA URNS LPF: ABNORMAL /LPF
KETONES UR QL STRIP.AUTO: NEGATIVE MG/DL
LEUKOCYTE ESTERASE UR QL STRIP.AUTO: 75 UNIT/L
MUCOUS THREADS URNS QL MICRO: ABNORMAL /LPF
NITRITE UR QL STRIP.AUTO: NEGATIVE
PH UR STRIP.AUTO: 6.5 [PH]
PROT UR QL STRIP.AUTO: NEGATIVE MG/DL
RBC #/AREA URNS AUTO: ABNORMAL /HPF
RBC UR QL AUTO: ABNORMAL UNIT/L
SP GR UR STRIP.AUTO: 1.02
SQUAMOUS #/AREA URNS LPF: ABNORMAL /HPF
UROBILINOGEN UR STRIP-ACNC: NORMAL MG/DL
WBC #/AREA URNS AUTO: ABNORMAL /HPF

## 2023-05-31 PROCEDURE — 3008F BODY MASS INDEX DOCD: CPT | Mod: CPTII,,, | Performed by: NURSE PRACTITIONER

## 2023-05-31 PROCEDURE — 81001 URINALYSIS AUTO W/SCOPE: CPT | Performed by: NURSE PRACTITIONER

## 2023-05-31 PROCEDURE — 3077F PR MOST RECENT SYSTOLIC BLOOD PRESSURE >= 140 MM HG: ICD-10-PCS | Mod: CPTII,,, | Performed by: NURSE PRACTITIONER

## 2023-05-31 PROCEDURE — 1159F PR MEDICATION LIST DOCUMENTED IN MEDICAL RECORD: ICD-10-PCS | Mod: CPTII,,, | Performed by: NURSE PRACTITIONER

## 2023-05-31 PROCEDURE — 3061F PR NEG MICROALBUMINURIA RESULT DOCUMENTED/REVIEW: ICD-10-PCS | Mod: CPTII,,, | Performed by: NURSE PRACTITIONER

## 2023-05-31 PROCEDURE — 3066F PR DOCUMENTATION OF TREATMENT FOR NEPHROPATHY: ICD-10-PCS | Mod: CPTII,,, | Performed by: NURSE PRACTITIONER

## 2023-05-31 PROCEDURE — 3078F DIAST BP <80 MM HG: CPT | Mod: CPTII,,, | Performed by: NURSE PRACTITIONER

## 2023-05-31 PROCEDURE — 99214 OFFICE O/P EST MOD 30 MIN: CPT | Mod: S$PBB,,, | Performed by: NURSE PRACTITIONER

## 2023-05-31 PROCEDURE — 3061F NEG MICROALBUMINURIA REV: CPT | Mod: CPTII,,, | Performed by: NURSE PRACTITIONER

## 2023-05-31 PROCEDURE — 3008F PR BODY MASS INDEX (BMI) DOCUMENTED: ICD-10-PCS | Mod: CPTII,,, | Performed by: NURSE PRACTITIONER

## 2023-05-31 PROCEDURE — 99214 OFFICE O/P EST MOD 30 MIN: CPT | Mod: PBBFAC,PN | Performed by: NURSE PRACTITIONER

## 2023-05-31 PROCEDURE — 1160F RVW MEDS BY RX/DR IN RCRD: CPT | Mod: CPTII,,, | Performed by: NURSE PRACTITIONER

## 2023-05-31 PROCEDURE — 3066F NEPHROPATHY DOC TX: CPT | Mod: CPTII,,, | Performed by: NURSE PRACTITIONER

## 2023-05-31 PROCEDURE — 1160F PR REVIEW ALL MEDS BY PRESCRIBER/CLIN PHARMACIST DOCUMENTED: ICD-10-PCS | Mod: CPTII,,, | Performed by: NURSE PRACTITIONER

## 2023-05-31 PROCEDURE — 1159F MED LIST DOCD IN RCRD: CPT | Mod: CPTII,,, | Performed by: NURSE PRACTITIONER

## 2023-05-31 PROCEDURE — 3078F PR MOST RECENT DIASTOLIC BLOOD PRESSURE < 80 MM HG: ICD-10-PCS | Mod: CPTII,,, | Performed by: NURSE PRACTITIONER

## 2023-05-31 PROCEDURE — 3077F SYST BP >= 140 MM HG: CPT | Mod: CPTII,,, | Performed by: NURSE PRACTITIONER

## 2023-05-31 PROCEDURE — 99214 PR OFFICE/OUTPT VISIT, EST, LEVL IV, 30-39 MIN: ICD-10-PCS | Mod: S$PBB,,, | Performed by: NURSE PRACTITIONER

## 2023-05-31 RX ORDER — ERGOCALCIFEROL 1.25 MG/1
50000 CAPSULE ORAL
Qty: 12 CAPSULE | Refills: 0 | Status: SHIPPED | OUTPATIENT
Start: 2023-05-31 | End: 2023-08-18

## 2023-05-31 RX ORDER — ESCITALOPRAM OXALATE 10 MG/1
10 TABLET ORAL DAILY
Qty: 30 TABLET | Refills: 11 | Status: SHIPPED | OUTPATIENT
Start: 2023-05-31 | End: 2024-01-31 | Stop reason: SDUPTHER

## 2023-05-31 RX ORDER — HYDROCHLOROTHIAZIDE 12.5 MG/1
12.5 TABLET ORAL DAILY
Qty: 30 TABLET | Refills: 11 | Status: SHIPPED | OUTPATIENT
Start: 2023-05-31

## 2023-05-31 NOTE — ASSESSMENT & PLAN NOTE
Chronic problem that is improved with tx Lexapro  Lexapro increase to 10mg po daily  RTC 1 month for med assessment,  BP recheck  Practice deep breathing or abdominal breathing exercises when anxiety occurs.  Exercise daily. Get sunlight daily.  Avoid caffeine, alcohol and stimulants.  Practice positive phrases and repeat throughout the day, yoga, lavender scents or Chamomile tea will help anxiety.  Set healthy boundaries, avoid people and conversations that increase stress.  Reports any symptoms of suicidal or homicidal ideations immediately, if clinic is closed go to nearest emergency room.

## 2023-05-31 NOTE — ASSESSMENT & PLAN NOTE
Appt with ortho June 27th  Wrist xray negative  PT referral last visit-starts PT next week  Continue Mobic for pain daily  Wrist splints advised

## 2023-05-31 NOTE — ASSESSMENT & PLAN NOTE
Chronic, stable on Lipitor   Stressed importance of dietary modifications. Follow a low cholesterol, low saturated fat diet with less that 200mg of cholesterol a day.  Avoid fried foods and high saturated fats (high saturated fats less than 7% of calories).  Add Flax Seed/Fish Oil supplements to diet. Increase dietary fiber.  Regular exercise can reduce LDL and raise HDL. Stressed importance of physical activity 5 times per week for 30 minutes per day.

## 2023-05-31 NOTE — ASSESSMENT & PLAN NOTE
Chronic, improved with anxiety tx with Lexapro  Avoid caffeine, alcohol and stimulants. Do not use illicit drugs.  Practice positive phrases and repeat throughout the day.  Try yoga, lavender scents or Chamomile tea to promote relaxation.  Set healthy boundaries, avoid people and conversations that increase stress.  Avoid caffeinated beverages after lunch  Avoid alcohol near bedtime (eg, late afternoon and evening)  Avoid smoking or other nicotine intake, particularly during the evening  Exercise regularly for at least 20 minutes, preferably more than four to five hours prior to bedtime  Avoid daytime naps, especially if they are longer than 20 to 30 minutes or occur late in the day  Resolve concerns or worries before bedtime  Try not to force sleep    Sleep Hygiene Techniques: Sleep hygiene refers to actions that tend to improve and maintain good sleep  Power down electronic devices at least one hour prior to bedtime.  Keep room dark; use eye mask or relaxation sound machine to promote rest.  Sleep as long as necessary to feel rested (usually seven to eight hours for adults) and then get out of bed  Maintain a regular sleep schedule, particularly a regular wake-up time in the morning

## 2023-05-31 NOTE — ASSESSMENT & PLAN NOTE
Educated on increasing foods high in Vitamin D such as fish oil, cod liver oil, salmon, milk fortified with vitamin D.  RX Vitamin D3 91651 IU weekly x 12 weeks.  Complete entire 12 weeks of Vitamin D prescription.  After completion of prescription (12 weeks/3 months), begin taking Vitamin D 2000 I.U. tablets daily (purchase over the counter).  Repeat Vitamin D level as ordered.

## 2023-05-31 NOTE — PROGRESS NOTES
Patient Name: Traci Petersen   : 1970  MRN: [id number]     SUBJECTIVE DATA:    CHIEF COMPLAINT:   Traci Petersen is a 52 y.o. female who presents to clinic today with Follow-up (F/U for lab review)        HPI:  23:  Follow-up 2 weeks (supposed to be virtual appointment) to discuss labs and for med assessment of Lexapro, BP recheck.  MMG done at [facility]-will attempt to obtain records.    Vitamin D deficiency:  14.9 on last draw.      Bilateral carpal tunnel:  Pt has ortho appt as new patient 23.    She was seeing ortho in [place] for bilateral carpal tunnel, Pt is requesting a referral to Ortho in [place], told to find Therapy here in [place]. HX: She had surgery on right wrist for carpal tunnel release 1 year ago and is still having pain/issues.  Left wrist also painful and has not had any intervention on this one yet.  .  She was on Diclofenac for her wrist and it did not help according to her. She has not tried any other NSAIDs.  She has had CSI injections in past on right wrist but nothing done on left. There are no xrays in system on let wrist either.      c/o Foot pain left anterior foot and under foot: New problem.  She is asking for podiatrist in [place]- had appt in [place] but missed it. Encouraged to reschedule as we do not have resources in [place] for this specialty.  Hx type 2 DM, unsure of control.  Tpday she states has appointment with podiatry in January.    Insomnia: Lexapro 5mg po daily prescribed last visit and discussed trial of Melatonin last visit to treat anxiety first which could resolve insomnia. States has improved insomnia with Lexapro.  She previously had not tried any meds for this.  Believed it stemmed from Anxiety which was not well-treated.  Was not currently on medications for anxiety at last visit, had tried and failed Zoloft.     Anxiety: Lexapro trialed last visit.  Helping somewhat-improved.  Previously has tried and failed  Zoloft only.  She states she feels less anxious on meds and has had no major side effects with Lexapro.  Is amenable to increasing to 10 mg po daily.     Type 2 DM:  A1C 6.0 last visit, microalbumin WNL. Glucose 143 on labs. She is only on Metformin 500mg po BID with meals currently which has her diabetes well-controlled.  Due for Fundus?    HLD:  FLP 2 weeks ago showed: 05/02/23 09:15 Cholesterol: 186; HDL: 66 (H); LDL Cholesterol External: 99.00; Total Cholesterol/HDL Ratio: 3; Triglycerides: 103  Very Low Density Lipoprotein: 21. These values were without Lipitor for months.  She was on Lipitor but had been unable to obtain her medications due to lack of refills.  Restarted at last visit.      HTN: Restarted Amlodipine last visit because she had not been on Amlodipine since October last fill.  She had not been able to get her medications filled.  Today her BP is 154/74. She thinks due to pain and has been told this in past. Pain from right wrist today.     ____________________________________  5/2/23:  51 y/o BF presents to clinic to establish care today, moving from [place].  PMH, bilateral carpal tunnel, HTN, HLD, Type 2 DM.   Bilateral carpal tunnel:  She is seeing ortho there for bilateral carpal tunnel, told to find Therapy here in [place].   She had surgery on right wrist for carpal tunnel release 1 year ago and is still having pain/issues.  Left wrist also painful and has not had any intervention on this one yet.  .  She was on Diclofenac for her wrist and it did not help according to her. She has not tried any other NSAIDs.  She has had CSI injections in past on right wrist but nothing done on left. There are no xrays in system on let wrist either.  Pt is requesting a referral to Ortho.    c/o Foot pain left anterior foot and under foot: New problem.  She is asking for podiatrist, had appt in [place] but missed it. Encouraged to reschedule as we do not have resources in [place] for this specialty.  " Hx type 2 DM, unsure of control.     Insomnia: has not tried any meds for this.  Believes it stems from Anxiety which is not well-treated.  Is not currently on medications for anxiety.     Anxiety: has tried and failed Zoloft only.  Is amenable to trying Lexapro.  She is not on Zoloft any longer and states it did not work for her anxiety.      Type 2 DM:  unsure of last A1c or if she has had eye exam, microalbumin, foot exam.  She is only on Metformin 500mg po BID with meals currently.      HLD:  unsure of last FLP results.  She was on Lipitor but has been unable to obtain her medications due to lack of refills.      HTN: Has not been on Amlodipine since October last fill.  She has not been able to get her medications filled.  Today her BP is 148/78.      Wellness:  She had MMG in January and Cologard testing in January as well.  Pt refused Shingles vaccination today.              ALLERGIES: Review of patient's allergies indicates:  No Known Allergies      ROS:  Review of Systems   Musculoskeletal:  Positive for joint pain and myalgias.   All other systems reviewed and are negative.      OBJECTIVE DATA:  Vital signs  Vitals:    05/31/23 0853 05/31/23 0857   BP: (!) 157/88 (!) 154/74   BP Location: Left arm Left arm   Patient Position: Sitting Sitting   BP Method: Large (Automatic) Large (Manual)   Pulse: 60    Resp: 20    Temp: 98 °F (36.7 °C)    TempSrc: Oral    Weight: 83.8 kg (184 lb 11.2 oz)    Height: 5' 2" (1.575 m)       Body mass index is 33.78 kg/m².    PHYSICAL EXAM:   Physical Exam  Vitals and nursing note reviewed.   HENT:      Head: Normocephalic and atraumatic.   Cardiovascular:      Rate and Rhythm: Normal rate and regular rhythm.      Pulses: Normal pulses.      Heart sounds: Normal heart sounds.   Pulmonary:      Breath sounds: Normal breath sounds.   Musculoskeletal:         General: Normal range of motion.      Cervical back: Normal range of motion.   Skin:     General: Skin is warm and dry. "   Neurological:      General: No focal deficit present.      Mental Status: She is alert and oriented to person, place, and time.   Psychiatric:         Mood and Affect: Mood normal.        ASSESSMENT/PLAN:  1. Vitamin D deficiency  Assessment & Plan:  Educated on increasing foods high in Vitamin D such as fish oil, cod liver oil, salmon, milk fortified with vitamin D.  RX Vitamin D3 39120 IU weekly x 12 weeks.  Complete entire 12 weeks of Vitamin D prescription.  After completion of prescription (12 weeks/3 months), begin taking Vitamin D 2000 I.U. tablets daily (purchase over the counter).  Repeat Vitamin D level as ordered.      Orders:  -     ergocalciferol (ERGOCALCIFEROL) 50,000 unit Cap; Take 1 capsule (50,000 Units total) by mouth every 7 days.  Dispense: 12 capsule; Refill: 0    2. Type 2 diabetes mellitus with other specified complication, without long-term current use of insulin  Overview:  Lab Results   Component Value Date    HGBA1C 6.0 05/02/2023         Assessment & Plan:  Chronic problem, stable. Continue Metformin  Encouraged ACE/ARB/Statin according to guidelines.  Follow ADA Diet. Avoid soda, simple sweets, and limit rice/pasta/breads/starches.  Maintain healthy weight with goal BMI <30. Exercise 5 times per week for 30 minutes per day.  Stressed importance of daily foot exams.  Stressed importance of annual dilated eye exam.  Last foot exam: May 2023  Last eye exam: unsure  Last micro albumin: May 2023          3. Mixed hyperlipidemia  Overview:  Lab Results   Component Value Date    CHOL 186 05/02/2023     Lab Results   Component Value Date    HDL 66 (H) 05/02/2023     No results found for: LDLCALC  No results found for: DLDL  Lab Results   Component Value Date    TRIG 103 05/02/2023       f1 No results found for: CHOLHDL      Assessment & Plan:  Chronic, stable on Lipitor   Stressed importance of dietary modifications. Follow a low cholesterol, low saturated fat diet with less that 200mg of  cholesterol a day.  Avoid fried foods and high saturated fats (high saturated fats less than 7% of calories).  Add Flax Seed/Fish Oil supplements to diet. Increase dietary fiber.  Regular exercise can reduce LDL and raise HDL. Stressed importance of physical activity 5 times per week for 30 minutes per day.             4. Primary hypertension  Overview:  BP Readings from Last 3 Encounters:   05/31/23 (!) 154/74   05/02/23 (!) 148/78   03/29/23 (!) 167/87         Assessment & Plan:  Chronic, worsening on Amlodipine 10 mg po daily  Adding HCTZ 12.5mg po daily  RTC 1 month for recheck.      Low Sodium Diet (Dash Diet - less than 2 grams of sodium per day).  Monitor Blood Pressure daily and log. Report any consistent numbers greater than 140/90.  Smoking Cessation encouraged to aid in BP reduction.  Maintain healthy weight with goal BMI <30. Exercise 30 minutes per day 5 days per week      Orders:  -     hydroCHLOROthiazide (HYDRODIURIL) 12.5 MG Tab; Take 1 tablet (12.5 mg total) by mouth once daily.  Dispense: 30 tablet; Refill: 11    5. Anxiety  Assessment & Plan:  Chronic problem that is improved with tx Lexapro  Lexapro increase to 10mg po daily  RTC 1 month for med assessment,  BP recheck  Practice deep breathing or abdominal breathing exercises when anxiety occurs.  Exercise daily. Get sunlight daily.  Avoid caffeine, alcohol and stimulants.  Practice positive phrases and repeat throughout the day, yoga, lavender scents or Chamomile tea will help anxiety.  Set healthy boundaries, avoid people and conversations that increase stress.  Reports any symptoms of suicidal or homicidal ideations immediately, if clinic is closed go to nearest emergency room.          6. Carpal tunnel syndrome, bilateral  Overview:  Formatting of this note might be different from the original.  Added automatically from request for surgery [id number]    Assessment & Plan:  Appt with ortho June 27th  Wrist xray negative  PT referral last  visit-starts PT next week  Continue Mobic for pain daily  Wrist splints advised      7. Hematuria, unspecified type  -     Urinalysis, Reflex to Urine Culture    8. Insomnia due to other mental disorder  Assessment & Plan:  Chronic, improved with anxiety tx with Lexapro  Avoid caffeine, alcohol and stimulants. Do not use illicit drugs.  Practice positive phrases and repeat throughout the day.  Try yoga, lavender scents or Chamomile tea to promote relaxation.  Set healthy boundaries, avoid people and conversations that increase stress.  Avoid caffeinated beverages after lunch  Avoid alcohol near bedtime (eg, late afternoon and evening)  Avoid smoking or other nicotine intake, particularly during the evening  Exercise regularly for at least 20 minutes, preferably more than four to five hours prior to bedtime  Avoid daytime naps, especially if they are longer than 20 to 30 minutes or occur late in the day  Resolve concerns or worries before bedtime  Try not to force sleep    Sleep Hygiene Techniques: Sleep hygiene refers to actions that tend to improve and maintain good sleep  Power down electronic devices at least one hour prior to bedtime.  Keep room dark; use eye mask or relaxation sound machine to promote rest.  Sleep as long as necessary to feel rested (usually seven to eight hours for adults) and then get out of bed  Maintain a regular sleep schedule, particularly a regular wake-up time in the morning        Other orders  -     EScitalopram oxalate (LEXAPRO) 10 MG tablet; Take 1 tablet (10 mg total) by mouth once daily.  Dispense: 30 tablet; Refill: 11           RESULTS:  Recent Results (from the past 1008 hour(s))   Lipid Panel    Collection Time: 05/02/23  9:15 AM   Result Value Ref Range    Cholesterol Total 186 <=200 mg/dL    HDL Cholesterol 66 (H) 35 - 60 mg/dL    Triglyceride 103 37 - 140 mg/dL    Cholesterol/HDL Ratio 3 0 - 5    Very Low Density Lipoprotein 21     LDL Cholesterol 99.00 50.00 - 140.00 mg/dL    Comprehensive Metabolic Panel    Collection Time: 05/02/23  9:15 AM   Result Value Ref Range    Sodium Level 138 136 - 145 mmol/L    Potassium Level 3.7 3.5 - 5.1 mmol/L    Chloride 105 98 - 107 mmol/L    Carbon Dioxide 23 22 - 29 mmol/L    Glucose Level 143 (H) 74 - 100 mg/dL    Blood Urea Nitrogen 9.3 (L) 9.8 - 20.1 mg/dL    Creatinine 0.74 0.55 - 1.02 mg/dL    Calcium Level Total 9.3 8.4 - 10.2 mg/dL    Protein Total 7.6 6.4 - 8.3 gm/dL    Albumin Level 3.9 3.5 - 5.0 g/dL    Globulin 3.7 (H) 2.4 - 3.5 gm/dL    Albumin/Globulin Ratio 1.1 1.1 - 2.0 ratio    Bilirubin Total 0.8 <=1.5 mg/dL    Alkaline Phosphatase 74 40 - 150 unit/L    Alanine Aminotransferase 12 0 - 55 unit/L    Aspartate Aminotransferase 16 5 - 34 unit/L    eGFR >60 mls/min/1.73/m2   Urinalysis    Collection Time: 05/02/23  9:15 AM   Result Value Ref Range    Color, UA Light-Yellow Yellow, Light-Yellow, Dark Yellow, Yenny, Straw    Appearance, UA Clear Clear    Specific Gravity, UA 1.024     pH, UA 6.0 5.0 - 8.5    Protein, UA Trace (A) Negative mg/dL    Glucose, UA Normal Negative, Normal mg/dL    Ketones, UA Negative Negative mg/dL    Blood, UA 1+ (A) Negative unit/L    Bilirubin, UA Negative Negative mg/dL    Urobilinogen, UA Normal 0.2, 1.0, Normal mg/dL    Nitrites, UA Negative Negative    Leukocyte Esterase, UA 75 (A) Negative unit/L    WBC, UA 6-10 (A) None Seen, 0-2, 3-5, 0-5 /HPF    Bacteria, UA Trace (A) None Seen /HPF    Squamous Epithelial Cells, UA Few (A) None Seen /HPF    Mucous, UA Few (A) None Seen /LPF    Hyaline Casts, UA 0-2 (A) None Seen /lpf    RBC, UA 0-5 None Seen, 0-2, 3-5, 0-5 /HPF   Hemoglobin A1C    Collection Time: 05/02/23  9:15 AM   Result Value Ref Range    Hemoglobin A1c 6.0 <=7.0 %    Estimated Average Glucose 125.5 mg/dL   TSH    Collection Time: 05/02/23  9:15 AM   Result Value Ref Range    Thyroid Stimulating Hormone 2.535 0.350 - 4.940 uIU/mL   T4, Free    Collection Time: 05/02/23  9:15 AM   Result Value Ref  Range    Thyroxine Free 0.92 0.70 - 1.48 ng/dL   Vitamin D    Collection Time: 05/02/23  9:15 AM   Result Value Ref Range    Vit D 25 OH 14.9 (L) 30.0 - 80.0 ng/mL   Vitamin B12    Collection Time: 05/02/23  9:15 AM   Result Value Ref Range    Vitamin B12 Level 417 213 - 816 pg/mL   Hepatitis Panel, Acute    Collection Time: 05/02/23  9:15 AM   Result Value Ref Range    Hepatitis A IgM Nonreactive Nonreactive    Hepatitis B Core IgM Nonreactive Nonreactive    Hepatitis B Surface Antigen Nonreactive Nonreactive    Hepatitis C Antibody Nonreactive Nonreactive   HIV 1/2 Ag/Ab (4th Gen)    Collection Time: 05/02/23  9:15 AM   Result Value Ref Range    HIV Nonreactive Nonreactive   SYPHILIS ANTIBODY (WITH REFLEX RPR)    Collection Time: 05/02/23  9:15 AM   Result Value Ref Range    Syphilis Antibody Nonreactive Nonreactive, Equivocal   CBC with Differential    Collection Time: 05/02/23  9:15 AM   Result Value Ref Range    WBC 5.69 4.50 - 11.50 x10(3)/mcL    RBC 5.10 4.20 - 5.40 x10(6)/mcL    Hgb 13.4 12.0 - 16.0 g/dL    Hct 42.9 37.0 - 47.0 %    MCV 84.1 80.0 - 94.0 fL    MCH 26.3 (L) 27.0 - 31.0 pg    MCHC 31.2 (L) 33.0 - 36.0 g/dL    RDW 12.3 11.5 - 17.0 %    Platelet 395 130 - 400 x10(3)/mcL    MPV 9.2 7.4 - 10.4 fL    Neut % 35.2 %    Lymph % 57.5 %    Mono % 5.4 %    Eos % 1.4 %    Basophil % 0.5 %    Lymph # 3.27 0.6 - 4.6 x10(3)/mcL    Neut # 2.00 (L) 2.1 - 9.2 x10(3)/mcL    Mono # 0.31 0.1 - 1.3 x10(3)/mcL    Eos # 0.08 0 - 0.9 x10(3)/mcL    Baso # 0.03 <=0.2 x10(3)/mcL    IG# 0.00 0 - 0.04 x10(3)/mcL    IG% 0.0 %    NRBC% 0.0 %   Microalbumin/Creatinine Ratio, Urine    Collection Time: 05/02/23  9:15 AM   Result Value Ref Range    Urine Microalbumin 17.2 <=30.0 ug/ml    Urine Creatinine 169.1 (H) 47.0 - 110.0 mg/dL    Microalbumin Creatinine Ratio 10.2 0.0 - 30.0 mg/gm Cr   Pathologist Interpretation    Collection Time: 05/02/23  9:15 AM   Result Value Ref Range    Pathology Review       No serological evidence  of recent or past hepatitis A, B, or C infection.    Bon Davis M.D.          Follow Up:  Follow up in about 1 month (around 6/30/2023) for DM, HTN, HLD, Carpal Tunnel, anxiety, insomnia, foot pain.               This note was created with the assistance of a voice recognition software or phone dictation. There may be transcription errors as a result of using this technology however minimal. Effort has been made to assure accuracy of transcription but any obvious errors or omissions should be clarified with the author of the document

## 2023-05-31 NOTE — ASSESSMENT & PLAN NOTE
Chronic, worsening on Amlodipine 10 mg po daily  Adding HCTZ 12.5mg po daily  RTC 1 month for recheck.      Low Sodium Diet (Dash Diet - less than 2 grams of sodium per day).  Monitor Blood Pressure daily and log. Report any consistent numbers greater than 140/90.  Smoking Cessation encouraged to aid in BP reduction.  Maintain healthy weight with goal BMI <30. Exercise 30 minutes per day 5 days per week

## 2023-05-31 NOTE — ASSESSMENT & PLAN NOTE
Chronic problem, stable. Continue Metformin  Encouraged ACE/ARB/Statin according to guidelines.  Follow ADA Diet. Avoid soda, simple sweets, and limit rice/pasta/breads/starches.  Maintain healthy weight with goal BMI <30. Exercise 5 times per week for 30 minutes per day.  Stressed importance of daily foot exams.  Stressed importance of annual dilated eye exam.  Last foot exam: May 2023  Last eye exam: unsure  Last micro albumin: May 2023

## 2023-06-07 ENCOUNTER — OFFICE VISIT (OUTPATIENT)
Dept: ORTHOPEDICS | Facility: CLINIC | Age: 53
End: 2023-06-07
Payer: MEDICAID

## 2023-06-07 ENCOUNTER — HOSPITAL ENCOUNTER (OUTPATIENT)
Dept: RADIOLOGY | Facility: HOSPITAL | Age: 53
Discharge: HOME | End: 2023-06-07
Attending: NURSE PRACTITIONER
Payer: MEDICAID

## 2023-06-07 DIAGNOSIS — G56.03 CARPAL TUNNEL SYNDROME, BILATERAL: ICD-10-CM

## 2023-06-07 DIAGNOSIS — G56.03 CARPAL TUNNEL SYNDROME, BILATERAL: Primary | ICD-10-CM

## 2023-06-07 PROCEDURE — 3066F NEPHROPATHY DOC TX: CPT | Mod: CPTII,,, | Performed by: NURSE PRACTITIONER

## 2023-06-07 PROCEDURE — 1159F PR MEDICATION LIST DOCUMENTED IN MEDICAL RECORD: ICD-10-PCS | Mod: CPTII,,, | Performed by: NURSE PRACTITIONER

## 2023-06-07 PROCEDURE — 1160F PR REVIEW ALL MEDS BY PRESCRIBER/CLIN PHARMACIST DOCUMENTED: ICD-10-PCS | Mod: CPTII,,, | Performed by: NURSE PRACTITIONER

## 2023-06-07 PROCEDURE — 99215 PR OFFICE/OUTPT VISIT, EST, LEVL V, 40-54 MIN: ICD-10-PCS | Mod: 25,S$PBB,, | Performed by: NURSE PRACTITIONER

## 2023-06-07 PROCEDURE — 20526 THER INJECTION CARP TUNNEL: CPT | Mod: PBBFAC,LT | Performed by: NURSE PRACTITIONER

## 2023-06-07 PROCEDURE — 73110 X-RAY EXAM OF WRIST: CPT | Mod: TC,RT

## 2023-06-07 PROCEDURE — 3061F PR NEG MICROALBUMINURIA RESULT DOCUMENTED/REVIEW: ICD-10-PCS | Mod: CPTII,,, | Performed by: NURSE PRACTITIONER

## 2023-06-07 PROCEDURE — 1159F MED LIST DOCD IN RCRD: CPT | Mod: CPTII,,, | Performed by: NURSE PRACTITIONER

## 2023-06-07 PROCEDURE — 3066F PR DOCUMENTATION OF TREATMENT FOR NEPHROPATHY: ICD-10-PCS | Mod: CPTII,,, | Performed by: NURSE PRACTITIONER

## 2023-06-07 PROCEDURE — 1160F RVW MEDS BY RX/DR IN RCRD: CPT | Mod: CPTII,,, | Performed by: NURSE PRACTITIONER

## 2023-06-07 PROCEDURE — 20526 CARPAL TUNNEL: ICD-10-PCS | Mod: S$PBB,LT,, | Performed by: NURSE PRACTITIONER

## 2023-06-07 PROCEDURE — 3061F NEG MICROALBUMINURIA REV: CPT | Mod: CPTII,,, | Performed by: NURSE PRACTITIONER

## 2023-06-07 PROCEDURE — 99215 OFFICE O/P EST HI 40 MIN: CPT | Mod: 25,S$PBB,, | Performed by: NURSE PRACTITIONER

## 2023-06-07 PROCEDURE — 99214 OFFICE O/P EST MOD 30 MIN: CPT | Mod: PBBFAC,25 | Performed by: NURSE PRACTITIONER

## 2023-06-07 RX ORDER — TRIAMCINOLONE ACETONIDE 40 MG/ML
40 INJECTION, SUSPENSION INTRA-ARTICULAR; INTRAMUSCULAR
Status: COMPLETED | OUTPATIENT
Start: 2023-06-07 | End: 2023-06-07

## 2023-06-07 RX ORDER — TRIAMCINOLONE ACETONIDE 40 MG/ML
40 INJECTION, SUSPENSION INTRA-ARTICULAR; INTRAMUSCULAR
Status: DISCONTINUED | OUTPATIENT
Start: 2023-06-07 | End: 2023-06-07 | Stop reason: HOSPADM

## 2023-06-07 RX ADMIN — TRIAMCINOLONE ACETONIDE 40 MG: 40 INJECTION, SUSPENSION INTRA-ARTICULAR; INTRAMUSCULAR at 08:06

## 2023-06-07 RX ADMIN — TRIAMCINOLONE ACETONIDE 40 MG: 40 INJECTION, SUSPENSION INTRA-ARTICULAR; INTRAMUSCULAR at 09:06

## 2023-06-07 NOTE — PROGRESS NOTES
Referral for bilateral hand carpal tunnel, no history of EMG study.    Subjective:   PATIENT ID: Traci Petersen is a 52 y.o. female. Non-smoker. Employment HX: , currently employed.    Seen [facility] for same DX since n/a.   CHIEF COMPLAINT: Numbness of the Right Wrist; Pain, Numbness, and Swelling of the Left Wrist; and Referral (carpel tunnel)    HPI:    Bilateral R = L numb diffuse hand and wrist pain. Left dominate  Injury: no known injury  Onset: several years ago worsening over time  Modifying Factors: worse with activity, radiates to wrist, and increased pain at PM  Associated Symptoms: numbness, decreased  strength, difficulty sleeping s/t pain, and weakness with dropping items  Activity: sedentary with light activity and pain moderately interferes with ADLs .   Previous Treatments: nocturnal wrist splint, RX NSAIDs, CSI since 2022 last injection 2022 RIGHT only early with ortho MD in [facility], and RX OT completed RIGHT only 8 wks/ 2 xs per week d/c'd 11/2022  without significant relief.  PMH:  DX complex regional pain syndrome,   Family History: + OA    NOTE: New patient  referral for bilateral hand numbness with no history of EMG study.  Right carpal tunnel release completed in [place] 5/2022 with continued symptoms.      Current Outpatient Medications:     amLODIPine (NORVASC) 10 MG tablet, Take 1 tablet (10 mg total) by mouth once daily., Disp: 90 tablet, Rfl: 1    atorvastatin (LIPITOR) 40 MG tablet, Take 1 tablet (40 mg total) by mouth every evening., Disp: 90 tablet, Rfl: 1    blood-glucose meter Misc, by Other route., Disp: , Rfl:     ergocalciferol (ERGOCALCIFEROL) 50,000 unit Cap, Take 1 capsule (50,000 Units total) by mouth every 7 days., Disp: 12 capsule, Rfl: 0    EScitalopram oxalate (LEXAPRO) 10 MG tablet, Take 1 tablet (10 mg total) by mouth once daily., Disp: 30 tablet, Rfl: 11    hydroCHLOROthiazide (HYDRODIURIL) 12.5 MG Tab, Take 1 tablet (12.5 mg total) by mouth  once daily., Disp: 30 tablet, Rfl: 11    metFORMIN (GLUCOPHAGE) 500 MG tablet, Take 1 tablet (500 mg total) by mouth 2 (two) times daily with meals., Disp: 180 tablet, Rfl: 1    meloxicam (MOBIC) 15 MG tablet, Take 1 tablet (15 mg total) by mouth once daily. (Patient not taking: Reported on 6/7/2023), Disp: 30 tablet, Rfl: 2  No current facility-administered medications for this visit.  Review of patient's allergies indicates:  No Known Allergies  Hemoglobin A1c   Date Value Ref Range Status   05/02/2023 6.0 <=7.0 % Final      There is no height or weight on file to calculate BMI.   Vitals:    06/07/23 0844   PainSc:   5      REVIEW OF SYSTEMS:  A ten-point review of systems was performed and is negative, except as mentioned above   Objective:   MSK Bilateral Hand/ Wrist  General:  no apparent distress, no pain indicators, obesity  Inspection:  no masses/cyst/nodules, no swelling, no erythema, no contusion and no nodule   Palpation: non-tender, normal temperature, non-tender flexor tendons of digits    ROM all of the following bilateral without limitation, non-painful  Flexion / Extension   Radial Deviation   Thumb Flexion CMC/ MCP/ IP      Strength: BILATERAL 4 / 5, non-painful    Special Testing:  Trigger Finger   -- (L) -- (R)  Phalen    + (L) + (R)    Cj Carpal Compression + (L) + (R)    Tinel's Test    -- (L) -- (R)  Finkelstein Test   -- (L) -- (R)  CMC Grind Test   -- (L) -- (R)  Scaphoid Shift Test   -- (L) -- (R)  Lunotriquetral Shuck Test  -- (L)  -- (R)  Ulnar Fovea Sign   -- (L)  -- (R)  UCL Ligament Thumb Test  -- (L)  -- (R)  Wartenberg Sign   -- (L)  -- (R)  Froment's Sign   -- (L)  -- (R)  Wolff's Sign   -- (L)  -- (R)  Neuro/ Vascular: Spurling's Test positive, intact to light touch, capillary refill 2 seconds,  radial pulse equal 2+, 2 point discrimination intact, Peter's test intact  Radial Nerve:    IP joint extension against resistance intact  Median Nerve:   Palmar abduction of thumb  intact  Anterior interosseous branch: OK sign intact    Ulnar Nerve:    Abduction of fingers against resistance intact  Neuro/ Psych: Awake, alert, oriented, normal mood and affect  Lymphatic: No LAD  Skin/ Soft Tissue: no rash, skin intact  Cardio: no edema, vascular integrity noted  Resp: no increased respiratory effort noted  Assessment:   IMAGING: X-ray 3 views of right wrist ordered, reviewed and independently interpreted by me. Discussed with patient. No acute findings .  Awaiting radiologist findings.   X-ray 4 views of left wrist dated 5/31/23, reviewed and independently interpreted by me. Discussed with patient. No acute findings .    XR WRIST COMPLETE 3 VIEWS LEFT 5/2/23  CLINICAL HISTORY:  Carpal tunnel syndrome, bilateral upper limbs left wrist carpal tunnel;  COMPARISON:  None.  FINDINGS:  There is no acute fracture or malalignment.  The soft tissues are unremarkable.  Impression:  No acute bony abnormality.    EMR REVIEW: completed with noted Referral documentation reviewed    EMG Study: none    DIAGNOSIS:  1. Carpal tunnel syndrome, bilateral    2. BMI 33.0-33.9,adult       Plan:     Orders Placed This Encounter    Carpal Tunnel    X-Ray Wrist Complete Right    Ambulatory referral/consult to Neurology    triamcinolone acetonide injection 40 mg     Ongoing education about DX and treatment recommendations including conservative treatments of daily HEP for carpal tunnel, nocturnal splinting of wrist PRN and OTC NSAID as needed according to label instructions if able to tolerate.   Treatment plan: Moderate exacerbation of chronic Bilateral R = L Carpal tunnel syndrome hand numbness and history of right carpal tunnel release with continued symptoms complicated by joint stiffness and possible cervical radiculopathy  EMG study needed for definitive DX. Patient referred to Dr. Edd Powell [phone] Address: [address] [place] [id number]. Patient instructed to call clinic for f/u once study  completed. Appropriate treatment plan will be based on EMG findings.   Formal RX OT ordered, patient instructed to contact insurance in order to locate provider.   Wrist splints provided.   Procedure: CSI discussed, s/t failed conservative treatments patient consents to CSI today  Left only  RX Medications: continue medications as RX per PCP .   RTC: after EMG study complete    Payton GREGG  Procedure Note:   Carpal Tunnel    Date/Time: 6/7/2023 8:20 AM  Performed by: Payton Lozoya NP  Authorized by: Payton Lozoya NP     Location:  Wrist  Site:  L carpal tunnel  Medications:  40 mg triamcinolone acetonide 40 mg/mL     Additional Comments: Ortho Procedure Note   Procedure: LEFT Carpal Tunnel CSI     Indications: Therapeutic Indication - Decrease pain, Increase range of motion and improve quality of life:   Risks:  Possible complications with the injection include bleeding, infection (.01%), tendon rupture, steroid flare, fat pad or soft tissue atrophy, skin depigmentation, allergic reaction to medications and vasovagal response. (steroid flare treatment is rest, ice, NSAIDs and resolves in 24-36 hours)  Consent:  Procedure, risks, benefits, and alternatives explained the patient, who voiced understanding and agreed to proceed with procedure.  Consent signed and scanned into the medical record.   No absolute contraindications (cellulitis overlying joint, infection, lack of informed consent, allergy to injection medication or history of steroid flare) or relative contraindications (uncontrolled DM2 A1c>10, coagulopathy, INR > 3.5, previous joint replacement or history of AVN).        Staff: Payton GREGG  Description:  Time-out performed.  The patient was prepped in normal sterile fashion use of chlorhexidine scrub and the appropriate and anatomic landmarks were identified.  Sterile 25 gauge 5/8 inch needle was used. Topical ethyl chloride was applied. Contents of syringe  included:    LEFT wrist: 40 mg of Kenalog injected     Complications: None   EBL: None   Post Procedure: Patient alert, and moving all extremities. ROM improved, pain decreased.  Good peripheral pulses, no signs of vascular compromise and range of motion intact.  Aftercare instructions were given to patient at time of discharge.  Relative rest for 3 days-avoiding excess activity.  Place ice on the area for 15 minutes every 4-6 hours. Patient may take Tylenol a 1000 mg b.i.d. or ibuprofen 600 mg t.i.d. for the next 3-4 days if not on medication already and safe to take pending co-morbidities.  Protect the area for the next 1-8 hours if anesthetic was used.  Avoid excessive activity for the next 3-4 weeks.  ER precautions given for fever, severe joint pain or allergic reaction or other new symptoms related to the joint injection.        Timed Billing Note  Total Time Spent with Patient: 45 minutes Excludes Time Spent Performing Procedure  Visit Start Time: 0910  10 minutes spent prior to visit reviewing EMR, prior labs and x-rays.  25 minutes spent in visit with patient face-to-face time completing exam, obtaining history, educating on DX and treatment plan.  10 minutes spent after visit completing EMR documentation.   Visit End Time: 0955

## 2023-06-30 ENCOUNTER — OFFICE VISIT (OUTPATIENT)
Dept: FAMILY MEDICINE | Facility: CLINIC | Age: 53
End: 2023-06-30
Payer: MEDICAID

## 2023-06-30 VITALS
HEART RATE: 61 BPM | WEIGHT: 181 LBS | OXYGEN SATURATION: 98 % | DIASTOLIC BLOOD PRESSURE: 71 MMHG | RESPIRATION RATE: 20 BRPM | HEIGHT: 62 IN | BODY MASS INDEX: 33.31 KG/M2 | SYSTOLIC BLOOD PRESSURE: 111 MMHG | TEMPERATURE: 98 F

## 2023-06-30 DIAGNOSIS — I10 PRIMARY HYPERTENSION: ICD-10-CM

## 2023-06-30 DIAGNOSIS — G56.03 CARPAL TUNNEL SYNDROME, BILATERAL: ICD-10-CM

## 2023-06-30 DIAGNOSIS — Z12.31 ENCOUNTER FOR SCREENING MAMMOGRAM FOR MALIGNANT NEOPLASM OF BREAST: ICD-10-CM

## 2023-06-30 DIAGNOSIS — Z12.11 ENCOUNTER FOR SCREENING FOR MALIGNANT NEOPLASM OF COLON: ICD-10-CM

## 2023-06-30 DIAGNOSIS — E11.42 DIABETIC POLYNEUROPATHY ASSOCIATED WITH TYPE 2 DIABETES MELLITUS: Primary | ICD-10-CM

## 2023-06-30 PROCEDURE — 3074F PR MOST RECENT SYSTOLIC BLOOD PRESSURE < 130 MM HG: ICD-10-PCS | Mod: CPTII,,, | Performed by: NURSE PRACTITIONER

## 2023-06-30 PROCEDURE — 3008F PR BODY MASS INDEX (BMI) DOCUMENTED: ICD-10-PCS | Mod: CPTII,,, | Performed by: NURSE PRACTITIONER

## 2023-06-30 PROCEDURE — 99214 OFFICE O/P EST MOD 30 MIN: CPT | Mod: S$PBB,,, | Performed by: NURSE PRACTITIONER

## 2023-06-30 PROCEDURE — 1159F MED LIST DOCD IN RCRD: CPT | Mod: CPTII,,, | Performed by: NURSE PRACTITIONER

## 2023-06-30 PROCEDURE — 99214 OFFICE O/P EST MOD 30 MIN: CPT | Mod: PBBFAC,PN | Performed by: NURSE PRACTITIONER

## 2023-06-30 PROCEDURE — 1159F PR MEDICATION LIST DOCUMENTED IN MEDICAL RECORD: ICD-10-PCS | Mod: CPTII,,, | Performed by: NURSE PRACTITIONER

## 2023-06-30 PROCEDURE — 3061F PR NEG MICROALBUMINURIA RESULT DOCUMENTED/REVIEW: ICD-10-PCS | Mod: CPTII,,, | Performed by: NURSE PRACTITIONER

## 2023-06-30 PROCEDURE — 1160F PR REVIEW ALL MEDS BY PRESCRIBER/CLIN PHARMACIST DOCUMENTED: ICD-10-PCS | Mod: CPTII,,, | Performed by: NURSE PRACTITIONER

## 2023-06-30 PROCEDURE — 3066F PR DOCUMENTATION OF TREATMENT FOR NEPHROPATHY: ICD-10-PCS | Mod: CPTII,,, | Performed by: NURSE PRACTITIONER

## 2023-06-30 PROCEDURE — 3008F BODY MASS INDEX DOCD: CPT | Mod: CPTII,,, | Performed by: NURSE PRACTITIONER

## 2023-06-30 PROCEDURE — 3074F SYST BP LT 130 MM HG: CPT | Mod: CPTII,,, | Performed by: NURSE PRACTITIONER

## 2023-06-30 PROCEDURE — 99214 PR OFFICE/OUTPT VISIT, EST, LEVL IV, 30-39 MIN: ICD-10-PCS | Mod: S$PBB,,, | Performed by: NURSE PRACTITIONER

## 2023-06-30 PROCEDURE — 3078F PR MOST RECENT DIASTOLIC BLOOD PRESSURE < 80 MM HG: ICD-10-PCS | Mod: CPTII,,, | Performed by: NURSE PRACTITIONER

## 2023-06-30 PROCEDURE — 3066F NEPHROPATHY DOC TX: CPT | Mod: CPTII,,, | Performed by: NURSE PRACTITIONER

## 2023-06-30 PROCEDURE — 1160F RVW MEDS BY RX/DR IN RCRD: CPT | Mod: CPTII,,, | Performed by: NURSE PRACTITIONER

## 2023-06-30 PROCEDURE — 3061F NEG MICROALBUMINURIA REV: CPT | Mod: CPTII,,, | Performed by: NURSE PRACTITIONER

## 2023-06-30 PROCEDURE — 3078F DIAST BP <80 MM HG: CPT | Mod: CPTII,,, | Performed by: NURSE PRACTITIONER

## 2023-06-30 RX ORDER — GABAPENTIN 100 MG/1
100 CAPSULE ORAL 3 TIMES DAILY
Qty: 90 CAPSULE | Refills: 11 | Status: SHIPPED | OUTPATIENT
Start: 2023-06-30 | End: 2024-01-31 | Stop reason: SDUPTHER

## 2023-06-30 NOTE — ASSESSMENT & PLAN NOTE
Stable, chronic  Low Sodium Diet (Dash Diet - less than 2 grams of sodium per day).  Monitor Blood Pressure daily and log. Report any consistent numbers greater than 140/90.  Smoking Cessation encouraged to aid in BP reduction.  Maintain healthy weight with goal BMI <30. Exercise 30 minutes per day 5 days per week

## 2023-06-30 NOTE — PROGRESS NOTES
Patient Name: Traci Petersen   : 1970  MRN: [id number]     SUBJECTIVE DATA:    CHIEF COMPLAINT:   Traci Petersen is a 52 y.o. female who presents to clinic today with Follow-up (1 month f/u for foot pain, anxiety, HTN, HLD, DM2)        HPI:  23:  1 month FU BP recheck.  Added HCTZ to regimen. BP today 111/71. C/o ongoing burning and stinging to bilateral feet coming up her legs.  It is worse at night.  HTN: Restarted Amlodipine last visit because she had not been on Amlodipine since October last fill.  She had not been able to get her medications filled.  Today her BP is 111/71.   Carpal Tunnel Syndrome: States CSI injections did not work given by ortho.  Is going to start OT with [facility] physical therapy.     23:  Follow-up 2 weeks (supposed to be virtual appointment) to discuss labs and for med assessment of Lexapro, BP recheck.  MMG done at [facility]-will attempt to obtain records.    Vitamin D deficiency:  14.9 on last draw.      Bilateral carpal tunnel:  Pt has ortho appt as new patient 23.    She was seeing ortho in [place] for bilateral carpal tunnel, Pt is requesting a referral to Ortho in [place], told to find Therapy here in [place]. HX: She had surgery on right wrist for carpal tunnel release 1 year ago and is still having pain/issues.  Left wrist also painful and has not had any intervention on this one yet.  .  She was on Diclofenac for her wrist and it did not help according to her. She has not tried any other NSAIDs.  She has had CSI injections in past on right wrist but nothing done on left. There are no xrays in system on let wrist either.      c/o Foot pain left anterior foot and under foot: New problem.  She is asking for podiatrist in [place]- had appt in [place] but missed it. Encouraged to reschedule as we do not have resources in [place] for this specialty.  Hx type 2 DM, unsure of control.  Tpday she states has appointment with  podiatry in January.    Insomnia: Lexapro 5mg po daily prescribed last visit and discussed trial of Melatonin last visit to treat anxiety first which could resolve insomnia. States has improved insomnia with Lexapro.  She previously had not tried any meds for this.  Believed it stemmed from Anxiety which was not well-treated.  Was not currently on medications for anxiety at last visit, had tried and failed Zoloft.     Anxiety: Lexapro trialed last visit.  Helping somewhat-improved.  Previously has tried and failed Zoloft only.  She states she feels less anxious on meds and has had no major side effects with Lexapro.  Is amenable to increasing to 10 mg po daily.     Type 2 DM:  A1C 6.0 last visit, microalbumin WNL. Glucose 143 on labs. She is only on Metformin 500mg po BID with meals currently which has her diabetes well-controlled.  Due for Fundus?    HLD:  FLP 2 weeks ago showed: 05/02/23 09:15 Cholesterol: 186; HDL: 66 (H); LDL Cholesterol External: 99.00; Total Cholesterol/HDL Ratio: 3; Triglycerides: 103  Very Low Density Lipoprotein: 21. These values were without Lipitor for months.  She was on Lipitor but had been unable to obtain her medications due to lack of refills.  Restarted at last visit.      HTN: Restarted Amlodipine last visit because she had not been on Amlodipine since October last fill.  She had not been able to get her medications filled.  Today her BP is 154/74. She thinks due to pain and has been told this in past. Pain from right wrist today.     ____________________________________  5/2/23:  51 y/o BF presents to clinic to establish care today, moving from [place].  PMH, bilateral carpal tunnel, HTN, HLD, Type 2 DM.   Bilateral carpal tunnel:  She is seeing ortho there for bilateral carpal tunnel, told to find Therapy here in [place].   She had surgery on right wrist for carpal tunnel release 1 year ago and is still having pain/issues.  Left wrist also painful and has not had any  "intervention on this one yet.  .  She was on Diclofenac for her wrist and it did not help according to her. She has not tried any other NSAIDs.  She has had CSI injections in past on right wrist but nothing done on left. There are no xrays in system on let wrist either.  Pt is requesting a referral to Ortho.    c/o Foot pain left anterior foot and under foot: New problem.  She is asking for podiatrist, had appt in [place] but missed it. Encouraged to reschedule as we do not have resources in [place] for this specialty.  Hx type 2 DM, unsure of control.     Insomnia: has not tried any meds for this.  Believes it stems from Anxiety which is not well-treated.  Is not currently on medications for anxiety.     Anxiety: has tried and failed Zoloft only.  Is amenable to trying Lexapro.  She is not on Zoloft any longer and states it did not work for her anxiety.      Type 2 DM:  unsure of last A1c or if she has had eye exam, microalbumin, foot exam.  She is only on Metformin 500mg po BID with meals currently.      HLD:  unsure of last FLP results.  She was on Lipitor but has been unable to obtain her medications due to lack of refills.      HTN: Has not been on Amlodipine since October last fill.  She has not been able to get her medications filled.  Today her BP is 148/78.      Wellness:  She had MMG in January and Cologard testing in January as well.  Pt refused Shingles vaccination today.                  ALLERGIES: Review of patient's allergies indicates:  No Known Allergies      ROS:  Review of Systems   Musculoskeletal:  Positive for joint pain.   All other systems reviewed and are negative.      OBJECTIVE DATA:  Vital signs  Vitals:    06/30/23 0829   BP: 111/71   BP Location: Left arm   Patient Position: Sitting   BP Method: Medium (Automatic)   Pulse: 61   Resp: 20   Temp: 97.7 °F (36.5 °C)   SpO2: 98%   Weight: 82.1 kg (181 lb)   Height: 5' 2" (1.575 m)      Body mass index is 33.11 kg/m².    PHYSICAL EXAM: "   Physical Exam  Vitals and nursing note reviewed.   HENT:      Head: Normocephalic and atraumatic.   Cardiovascular:      Rate and Rhythm: Normal rate and regular rhythm.      Pulses: Normal pulses.      Heart sounds: Normal heart sounds.   Pulmonary:      Breath sounds: Normal breath sounds.   Musculoskeletal:         General: Normal range of motion.      Cervical back: Normal range of motion.   Skin:     General: Skin is warm and dry.   Neurological:      General: No focal deficit present.      Mental Status: She is alert and oriented to person, place, and time.   Psychiatric:         Mood and Affect: Mood normal.        ASSESSMENT/PLAN:  1. Diabetic polyneuropathy associated with type 2 diabetes mellitus  Assessment & Plan:  Trial Gabapentin 100 mg po TID prn nerve pain    Orders:  -     gabapentin (NEURONTIN) 100 MG capsule; Take 1 capsule (100 mg total) by mouth 3 (three) times daily.  Dispense: 90 capsule; Refill: 11    2. Carpal tunnel syndrome, bilateral  Overview:  Formatting of this note might be different from the original.  Added automatically from request for surgery [id number]    Assessment & Plan:  Continue FU with Ortho      3. Primary hypertension  Overview:  BP Readings from Last 3 Encounters:   06/30/23 111/71   05/31/23 (!) 154/74   05/02/23 (!) 148/78         Assessment & Plan:  Stable, chronic  Low Sodium Diet (Dash Diet - less than 2 grams of sodium per day).  Monitor Blood Pressure daily and log. Report any consistent numbers greater than 140/90.  Smoking Cessation encouraged to aid in BP reduction.  Maintain healthy weight with goal BMI <30. Exercise 30 minutes per day 5 days per week        4. Encounter for screening mammogram for malignant neoplasm of breast  -     Mammo Digital Screening Bilat; Future; Expected date: 06/30/2023    5. Encounter for screening for malignant neoplasm of colon  -     Cologuard Screening (Multitarget Stool DNA); Future; Expected date: 06/30/2023            RESULTS:  Recent Results (from the past 1008 hour(s))   Urinalysis, Reflex to Urine Culture    Collection Time: 05/31/23  9:17 AM    Specimen: Urine   Result Value Ref Range    Color, UA Light-Yellow Yellow, Light-Yellow, Dark Yellow, Yenny, Straw    Appearance, UA Clear Clear    Specific Gravity, UA 1.019     pH, UA 6.5 5.0 - 8.5    Protein, UA Negative Negative mg/dL    Glucose, UA Normal Negative, Normal mg/dL    Ketones, UA Negative Negative mg/dL    Blood, UA Trace (A) Negative unit/L    Bilirubin, UA Negative Negative mg/dL    Urobilinogen, UA Normal 0.2, 1.0, Normal mg/dL    Nitrites, UA Negative Negative    Leukocyte Esterase, UA 75 (A) Negative unit/L    WBC, UA 0-5 None Seen, 0-2, 3-5, 0-5 /HPF    Bacteria, UA None Seen None Seen /HPF    Squamous Epithelial Cells, UA Many (A) None Seen /HPF    Mucous, UA Trace (A) None Seen /LPF    Hyaline Casts, UA None Seen None Seen /lpf    RBC, UA 0-5 None Seen, 0-2, 3-5, 0-5 /HPF         Follow Up:  Follow up in about 3 months (around 10/3/2023) for Diabetic FU, Wellness; Foot exam, Eye Exam(fundus), MMG-ordered, Cologard-ordered.               This note was created with the assistance of a voice recognition software or phone dictation. There may be transcription errors as a result of using this technology however minimal. Effort has been made to assure accuracy of transcription but any obvious errors or omissions should be clarified with the author of the document

## 2023-08-17 DIAGNOSIS — E55.9 VITAMIN D DEFICIENCY: ICD-10-CM

## 2023-08-18 RX ORDER — ERGOCALCIFEROL 1.25 MG/1
50000 CAPSULE ORAL
Qty: 12 CAPSULE | Refills: 0 | Status: SHIPPED | OUTPATIENT
Start: 2023-08-18 | End: 2023-12-19

## 2023-09-25 ENCOUNTER — TELEPHONE (OUTPATIENT)
Dept: RADIOLOGY | Facility: HOSPITAL | Age: 53
End: 2023-09-25
Payer: MEDICAID

## 2023-10-03 ENCOUNTER — OFFICE VISIT (OUTPATIENT)
Dept: FAMILY MEDICINE | Facility: CLINIC | Age: 53
End: 2023-10-03
Payer: MEDICAID

## 2023-10-03 VITALS
TEMPERATURE: 98 F | DIASTOLIC BLOOD PRESSURE: 78 MMHG | WEIGHT: 180 LBS | RESPIRATION RATE: 18 BRPM | OXYGEN SATURATION: 99 % | BODY MASS INDEX: 33.13 KG/M2 | HEART RATE: 63 BPM | HEIGHT: 62 IN | SYSTOLIC BLOOD PRESSURE: 123 MMHG

## 2023-10-03 DIAGNOSIS — G89.29 CHRONIC RIGHT SHOULDER PAIN: Primary | ICD-10-CM

## 2023-10-03 DIAGNOSIS — I10 HYPERTENSION, UNSPECIFIED TYPE: ICD-10-CM

## 2023-10-03 DIAGNOSIS — E11.69 TYPE 2 DIABETES MELLITUS WITH OTHER SPECIFIED COMPLICATION, WITHOUT LONG-TERM CURRENT USE OF INSULIN: ICD-10-CM

## 2023-10-03 DIAGNOSIS — I10 PRIMARY HYPERTENSION: ICD-10-CM

## 2023-10-03 DIAGNOSIS — F99 INSOMNIA DUE TO OTHER MENTAL DISORDER: ICD-10-CM

## 2023-10-03 DIAGNOSIS — E11.42 DIABETIC POLYNEUROPATHY ASSOCIATED WITH TYPE 2 DIABETES MELLITUS: ICD-10-CM

## 2023-10-03 DIAGNOSIS — N94.10 DYSPAREUNIA IN FEMALE: ICD-10-CM

## 2023-10-03 DIAGNOSIS — E78.2 MIXED HYPERLIPIDEMIA: ICD-10-CM

## 2023-10-03 DIAGNOSIS — F41.9 ANXIETY: ICD-10-CM

## 2023-10-03 DIAGNOSIS — G56.03 CARPAL TUNNEL SYNDROME, BILATERAL: ICD-10-CM

## 2023-10-03 DIAGNOSIS — M25.511 CHRONIC RIGHT SHOULDER PAIN: Primary | ICD-10-CM

## 2023-10-03 DIAGNOSIS — F51.05 INSOMNIA DUE TO OTHER MENTAL DISORDER: ICD-10-CM

## 2023-10-03 DIAGNOSIS — E78.5 HYPERLIPIDEMIA, UNSPECIFIED HYPERLIPIDEMIA TYPE: ICD-10-CM

## 2023-10-03 DIAGNOSIS — N94.10 DYSPAREUNIA, FEMALE: ICD-10-CM

## 2023-10-03 DIAGNOSIS — E55.9 VITAMIN D DEFICIENCY: ICD-10-CM

## 2023-10-03 LAB — DEPRECATED CALCIDIOL+CALCIFEROL SERPL-MC: 23.2 NG/ML (ref 30–80)

## 2023-10-03 PROCEDURE — 3066F NEPHROPATHY DOC TX: CPT | Mod: CPTII,,, | Performed by: NURSE PRACTITIONER

## 2023-10-03 PROCEDURE — 3008F PR BODY MASS INDEX (BMI) DOCUMENTED: ICD-10-PCS | Mod: CPTII,,, | Performed by: NURSE PRACTITIONER

## 2023-10-03 PROCEDURE — 3066F PR DOCUMENTATION OF TREATMENT FOR NEPHROPATHY: ICD-10-PCS | Mod: CPTII,,, | Performed by: NURSE PRACTITIONER

## 2023-10-03 PROCEDURE — 1159F MED LIST DOCD IN RCRD: CPT | Mod: CPTII,,, | Performed by: NURSE PRACTITIONER

## 2023-10-03 PROCEDURE — 3061F PR NEG MICROALBUMINURIA RESULT DOCUMENTED/REVIEW: ICD-10-PCS | Mod: CPTII,,, | Performed by: NURSE PRACTITIONER

## 2023-10-03 PROCEDURE — 3044F HG A1C LEVEL LT 7.0%: CPT | Mod: CPTII,,, | Performed by: NURSE PRACTITIONER

## 2023-10-03 PROCEDURE — 3044F PR MOST RECENT HEMOGLOBIN A1C LEVEL <7.0%: ICD-10-PCS | Mod: CPTII,,, | Performed by: NURSE PRACTITIONER

## 2023-10-03 PROCEDURE — 3078F PR MOST RECENT DIASTOLIC BLOOD PRESSURE < 80 MM HG: ICD-10-PCS | Mod: CPTII,,, | Performed by: NURSE PRACTITIONER

## 2023-10-03 PROCEDURE — 1160F RVW MEDS BY RX/DR IN RCRD: CPT | Mod: CPTII,,, | Performed by: NURSE PRACTITIONER

## 2023-10-03 PROCEDURE — 82306 VITAMIN D 25 HYDROXY: CPT | Performed by: NURSE PRACTITIONER

## 2023-10-03 PROCEDURE — 3061F NEG MICROALBUMINURIA REV: CPT | Mod: CPTII,,, | Performed by: NURSE PRACTITIONER

## 2023-10-03 PROCEDURE — 3078F DIAST BP <80 MM HG: CPT | Mod: CPTII,,, | Performed by: NURSE PRACTITIONER

## 2023-10-03 PROCEDURE — 99214 OFFICE O/P EST MOD 30 MIN: CPT | Mod: PBBFAC,PN | Performed by: NURSE PRACTITIONER

## 2023-10-03 PROCEDURE — 99214 OFFICE O/P EST MOD 30 MIN: CPT | Mod: S$PBB,,, | Performed by: NURSE PRACTITIONER

## 2023-10-03 PROCEDURE — 36415 COLL VENOUS BLD VENIPUNCTURE: CPT | Performed by: NURSE PRACTITIONER

## 2023-10-03 PROCEDURE — 3074F PR MOST RECENT SYSTOLIC BLOOD PRESSURE < 130 MM HG: ICD-10-PCS | Mod: CPTII,,, | Performed by: NURSE PRACTITIONER

## 2023-10-03 PROCEDURE — 99214 PR OFFICE/OUTPT VISIT, EST, LEVL IV, 30-39 MIN: ICD-10-PCS | Mod: S$PBB,,, | Performed by: NURSE PRACTITIONER

## 2023-10-03 PROCEDURE — 1160F PR REVIEW ALL MEDS BY PRESCRIBER/CLIN PHARMACIST DOCUMENTED: ICD-10-PCS | Mod: CPTII,,, | Performed by: NURSE PRACTITIONER

## 2023-10-03 PROCEDURE — 3074F SYST BP LT 130 MM HG: CPT | Mod: CPTII,,, | Performed by: NURSE PRACTITIONER

## 2023-10-03 PROCEDURE — 1159F PR MEDICATION LIST DOCUMENTED IN MEDICAL RECORD: ICD-10-PCS | Mod: CPTII,,, | Performed by: NURSE PRACTITIONER

## 2023-10-03 PROCEDURE — 3008F BODY MASS INDEX DOCD: CPT | Mod: CPTII,,, | Performed by: NURSE PRACTITIONER

## 2023-10-03 RX ORDER — AMLODIPINE BESYLATE 10 MG/1
10 TABLET ORAL DAILY
Qty: 90 TABLET | Refills: 1 | Status: SHIPPED | OUTPATIENT
Start: 2023-10-03

## 2023-10-03 RX ORDER — ATORVASTATIN CALCIUM 40 MG/1
40 TABLET, FILM COATED ORAL NIGHTLY
Qty: 90 TABLET | Refills: 1 | Status: SHIPPED | OUTPATIENT
Start: 2023-10-03

## 2023-10-03 RX ORDER — DICLOFENAC SODIUM 75 MG/1
75 TABLET, DELAYED RELEASE ORAL 2 TIMES DAILY
Qty: 60 TABLET | Refills: 3 | Status: SHIPPED | OUTPATIENT
Start: 2023-10-03 | End: 2024-01-31 | Stop reason: SDUPTHER

## 2023-10-03 RX ORDER — METFORMIN HYDROCHLORIDE 500 MG/1
500 TABLET ORAL 2 TIMES DAILY WITH MEALS
Qty: 180 TABLET | Refills: 1 | Status: SHIPPED | OUTPATIENT
Start: 2023-10-03

## 2023-10-03 NOTE — ASSESSMENT & PLAN NOTE
CMP, a1c today  Encouraged ACE/ARB/Statin according to guidelines.  Follow ADA Diet. Avoid soda, simple sweets, and limit rice/pasta/breads/starches.  Maintain healthy weight with goal BMI <30. Exercise 5 times per week for 30 minutes per day.  Stressed importance of daily foot exams.  Stressed importance of annual dilated eye exam.  Last foot exam: 10/3/23  Last eye exam:  6/2023  Last micro albumin: June 2023

## 2023-10-03 NOTE — ASSESSMENT & PLAN NOTE
Stable on meds, chronic problem  Low Sodium Diet (Dash Diet - less than 2 grams of sodium per day).  Monitor Blood Pressure daily and log. Report any consistent numbers greater than 140/90.  Smoking Cessation encouraged to aid in BP reduction.  Maintain healthy weight with goal BMI <30. Exercise 30 minutes per day 5 days per week

## 2023-10-03 NOTE — ASSESSMENT & PLAN NOTE
Continue metformin, refilled  Encouraged ACE/ARB/Statin according to guidelines.  Follow ADA Diet. Avoid soda, simple sweets, and limit rice/pasta/breads/starches.  Maintain healthy weight with goal BMI <30. Exercise 5 times per week for 30 minutes per day.  Stressed importance of daily foot exams.  Stressed importance of annual dilated eye exam.

## 2023-10-03 NOTE — ASSESSMENT & PLAN NOTE
Stable  BMI Body mass index is 32.92 kg/m².   Goal BMI <30.  Exercise 5 times a week for 30 minutes per day.  Avoid soda, simple sugars, excessive rice, potatoes or bread. Limit fast foods and fried foods.  Choose complex carbs in moderation (example: green vegetables, beans, oatmeal). Eat plenty of fresh fruits and vegetables with lean meats daily.  Do not skip meals. Eat a balanced portion size.  Avoid fad diets. Consider permanent healthy life style changes.

## 2023-10-03 NOTE — ASSESSMENT & PLAN NOTE
PT referral for shoulder pain  Diclofenac 75mg po BID with 3 refills to trial for pain in wrist and shoulder  Shoulder exercises

## 2023-10-03 NOTE — ASSESSMENT & PLAN NOTE
Continue lexapro as directed  Avoid caffeine, alcohol and stimulants. Do not use illicit drugs.  Practice positive phrases and repeat throughout the day.  Try yoga, lavender scents or Chamomile tea to promote relaxation.  Set healthy boundaries, avoid people and conversations that increase stress.  Avoid caffeinated beverages after lunch  Avoid alcohol near bedtime (eg, late afternoon and evening)  Avoid smoking or other nicotine intake, particularly during the evening  Exercise regularly for at least 20 minutes, preferably more than four to five hours prior to bedtime  Avoid daytime naps, especially if they are longer than 20 to 30 minutes or occur late in the day  Resolve concerns or worries before bedtime  Try not to force sleep    Sleep Hygiene Techniques: Sleep hygiene refers to actions that tend to improve and maintain good sleep  Power down electronic devices at least one hour prior to bedtime.  Keep room dark; use eye mask or relaxation sound machine to promote rest.  Sleep as long as necessary to feel rested (usually seven to eight hours for adults) and then get out of bed  Maintain a regular sleep schedule, particularly a regular wake-up time in the morning

## 2023-10-03 NOTE — ASSESSMENT & PLAN NOTE
Continue lexapro 5mg po daily      Obtain counseling resources, list given for patient to find.    Practice deep breathing or abdominal breathing exercises when anxiety occurs.  Exercise daily. Get sunlight daily.  Avoid caffeine, alcohol and stimulants.  Practice positive phrases and repeat throughout the day, yoga, lavender scents or Chamomile tea will help anxiety.  Set healthy boundaries, avoid people and conversations that increase stress.  Reports any symptoms of suicidal or homicidal ideations immediately, if clinic is closed go to nearest emergency room.

## 2023-10-03 NOTE — PROGRESS NOTES
Patient Name: Traci Petersen     : 1970    MRN: [id number]     Subjective:     Patient ID: Traci Petersen is a 53 y.o. female.    Chief Complaint:   Chief Complaint   Patient presents with    Follow-up     Pt reports hitting right great toe two months ago.  Pt  has h/o carpal tunnel and is requesting PT referral. Pt c/o right shoulder  and arm pain.        HPI: 10/3/23: right wrist pain, right shoulder pain, FU diabetes, htn, high cholesterol  Hx frozen shoulder on right. Was seeing ortho in [place] but moved to [place].  Has not seen anyone here yet for her shoulder.    Wrist pain bilaterally-has not FU with ortho since her EMG study was ordered and completed. EMG study done in  showed bilateral neuropathy of wrists, no radiculopathy.  Requests PT referral for shoulder.   Upcoming appt with podiatry in January, will keep this.  BP stable today.    Insomnia and anxiety stable on lexapro.    DM: controlled at 6 last visit.  Due for foot today.  Only on Metformin.   States had MMG done in January in [place].  No records in chart.    Today, pt has new complaint of painful sex.  Is menopausal. States she has tried lubricant.  Feels dry in vagina.  Pain in lower abdomen after and during sex.        23:  1 month FU BP recheck.  Added HCTZ to regimen. BP today 111/71. C/o ongoing burning and stinging to bilateral feet coming up her legs.  It is worse at night.  HTN: Restarted Amlodipine last visit because she had not been on Amlodipine since October last fill.  She had not been able to get her medications filled.  Today her BP is 111/71.   Carpal Tunnel Syndrome: States CSI injections did not work given by ortho.  Is going to start OT with [facility] physical therapy.       23:  Follow-up 2 weeks (supposed to be virtual appointment) to discuss labs and for med assessment of Lexapro, BP recheck.  MMG done at [facility]-will attempt to obtain records.    Vitamin D deficiency:   14.9 on last draw.      Bilateral carpal tunnel:  Pt has ortho appt as new patient 6/27/23.    She was seeing ortho in [place] for bilateral carpal tunnel, Pt is requesting a referral to Ortho in [place], told to find Therapy here in [place]. HX: She had surgery on right wrist for carpal tunnel release 1 year ago and is still having pain/issues.  Left wrist also painful and has not had any intervention on this one yet.  .  She was on Diclofenac for her wrist and it did not help according to her. She has not tried any other NSAIDs.  She has had CSI injections in past on right wrist but nothing done on left. There are no xrays in system on let wrist either.      c/o Foot pain left anterior foot and under foot: New problem.  She is asking for podiatrist in [place]- had appt in [place] but missed it. Encouraged to reschedule as we do not have resources in [place] for this specialty.  Hx type 2 DM, unsure of control.  Tpday she states has appointment with podiatry in January.    Insomnia: Lexapro 5mg po daily prescribed last visit and discussed trial of Melatonin last visit to treat anxiety first which could resolve insomnia. States has improved insomnia with Lexapro.  She previously had not tried any meds for this.  Believed it stemmed from Anxiety which was not well-treated.  Was not currently on medications for anxiety at last visit, had tried and failed Zoloft.     Anxiety: Lexapro trialed last visit.  Helping somewhat-improved.  Previously has tried and failed Zoloft only.  She states she feels less anxious on meds and has had no major side effects with Lexapro.  Is amenable to increasing to 10 mg po daily.     Type 2 DM:  A1C 6.0 last visit, microalbumin WNL. Glucose 143 on labs. She is only on Metformin 500mg po BID with meals currently which has her diabetes well-controlled.  Due for Fundus?    HLD:  FLP 2 weeks ago showed: 05/02/23 09:15 Cholesterol: 186; HDL: 66 (H); LDL Cholesterol External: 99.00;  Total Cholesterol/HDL Ratio: 3; Triglycerides: 103  Very Low Density Lipoprotein: 21. These values were without Lipitor for months.  She was on Lipitor but had been unable to obtain her medications due to lack of refills.  Restarted at last visit.      HTN: Restarted Amlodipine last visit because she had not been on Amlodipine since October last fill.  She had not been able to get her medications filled.  Today her BP is 154/74. She thinks due to pain and has been told this in past. Pain from right wrist today.         ROS:      Review of Systems   Musculoskeletal:  Positive for joint pain and myalgias.   All other systems reviewed and are negative.           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    Carpal tunnel syndrome     High cholesterol     HTN (hypertension)         Past Surgical History:   Procedure Laterality Date    CARPAL TUNNEL RELEASE Right     CHOLECYSTECTOMY      HYSTERECTOMY      TONSILLECTOMY      TUBAL LIGATION         Family History   Problem Relation Age of Onset    No Known Problems Mother     No Known Problems Father         Social History     Tobacco Use    Smoking status: Never    Smokeless tobacco: Never   Substance and Sexual Activity    Alcohol use: Yes     Comment: OCC    Drug use: No    Sexual activity: Yes       Current Outpatient Medications   Medication Instructions    amLODIPine (NORVASC) 10 mg, Oral, Daily    atorvastatin (LIPITOR) 40 mg, Oral, Nightly    blood-glucose meter Misc Other    diclofenac (VOLTAREN) 75 mg, Oral, 2 times daily    EScitalopram oxalate (LEXAPRO) 10 mg, Oral, Daily    gabapentin (NEURONTIN) 100 mg, Oral, 3 times daily    hydroCHLOROthiazide (HYDRODIURIL) 12.5 mg, Oral, Daily    metFORMIN (GLUCOPHAGE) 500 mg, Oral, 2 times daily with meals    VITAMIN D2 50,000 Units, Oral, Every 7 days        Review of patient's allergies indicates:  No Known Allergies    Objective:  "    Visit Vitals  /78 (BP Location: Left arm, Patient Position: Sitting, BP Method: Large (Automatic))   Pulse 63   Temp 98.4 °F (36.9 °C) (Oral)   Resp 18   Ht 5' 2" (1.575 m)   Wt 81.6 kg (180 lb)   SpO2 99%   BMI 32.92 kg/m²       Physical Examination:     Physical Exam  Vitals and nursing note reviewed.   HENT:      Head: Normocephalic and atraumatic.   Cardiovascular:      Rate and Rhythm: Normal rate and regular rhythm.      Pulses:           Dorsalis pedis pulses are 3+ on the right side and 3+ on the left side.        Posterior tibial pulses are 3+ on the right side and 3+ on the left side.      Heart sounds: Normal heart sounds.   Pulmonary:      Breath sounds: Normal breath sounds.   Musculoskeletal:         General: Normal range of motion.      Cervical back: Normal range of motion.      Right foot: Normal range of motion. No deformity or bunion.      Left foot: Normal range of motion. No deformity or bunion.   Feet:      Right foot:      Protective Sensation: 10 sites tested.  10 sites sensed.      Skin integrity: Skin integrity normal. No ulcer, blister or skin breakdown.      Toenail Condition: Right toenails are normal.      Left foot:      Protective Sensation: 10 sites tested.  10 sites sensed.      Skin integrity: Skin integrity normal. No ulcer, blister or skin breakdown.      Toenail Condition: Left toenails are normal.   Skin:     General: Skin is warm and dry.   Neurological:      General: No focal deficit present.      Mental Status: She is alert and oriented to person, place, and time.   Psychiatric:         Mood and Affect: Mood normal.       Lab Results:     Chemistry:  Lab Results   Component Value Date     05/02/2023    K 3.7 05/02/2023    CHLORIDE 105 05/02/2023    BUN 9.3 (L) 05/02/2023    CREATININE 0.74 05/02/2023    EGFRNORACEVR >60 05/02/2023    GLUCOSE 143 (H) 05/02/2023    CALCIUM 9.3 05/02/2023    ALKPHOS 74 05/02/2023    LABPROT 7.6 05/02/2023    ALBUMIN 3.9 " 05/02/2023    AST 16 05/02/2023    ALT 12 05/02/2023    JHKRPXNQ00PQ 14.9 (L) 05/02/2023    TSH 2.535 05/02/2023    ASLZAQ8CPVT 0.92 05/02/2023        Lab Results   Component Value Date    HGBA1C 6.0 05/02/2023        Hematology:  Lab Results   Component Value Date    WBC 5.69 05/02/2023    HGB 13.4 05/02/2023    HCT 42.9 05/02/2023     05/02/2023       Lipid Panel:  Lab Results   Component Value Date    CHOL 186 05/02/2023    HDL 66 (H) 05/02/2023    LDL 99.00 05/02/2023    TRIG 103 05/02/2023    TOTALCHOLEST 3 05/02/2023        Urine:  Lab Results   Component Value Date    COLORUA Light-Yellow 05/31/2023    APPEARANCEUA Clear 05/31/2023    SGUA 1.019 05/31/2023    PHUA 6.5 05/31/2023    PROTEINUA Negative 05/31/2023    GLUCOSEUA Normal 05/31/2023    KETONESUA Negative 05/31/2023    BLOODUA Trace (A) 05/31/2023    NITRITESUA Negative 05/31/2023    LEUKOCYTESUR 75 (A) 05/31/2023    RBCUA 0-5 05/31/2023    WBCUA 0-5 05/31/2023    BACTERIA None Seen 05/31/2023    SQEPUA Many (A) 05/31/2023    HYALINECASTS None Seen 05/31/2023    CREATRANDUR 169.1 (H) 05/02/2023        Assessment:          ICD-10-CM ICD-9-CM   1. Chronic right shoulder pain  M25.511 719.41    G89.29 338.29   2. Vitamin D deficiency  E55.9 268.9   3. Carpal tunnel syndrome, bilateral  G56.03 354.0   4. Diabetic polyneuropathy associated with type 2 diabetes mellitus  E11.42 250.60     357.2   5. Anxiety  F41.9 300.00   6. Insomnia due to other mental disorder  F51.05 300.9    F99 327.02   7. Primary hypertension  I10 401.9   8. Mixed hyperlipidemia  E78.2 272.2   9. Type 2 diabetes mellitus with other specified complication, without long-term current use of insulin  E11.69 250.80   10. BMI 33.0-33.9,adult  Z68.33 V85.33   11. Hyperlipidemia, unspecified hyperlipidemia type  E78.5 272.4   12. Hypertension, unspecified type  I10 401.9   13. Dyspareunia in female  N94.10 625.0   14. Dyspareunia, female  N94.10 625.0        Plan:     1. Chronic right  shoulder pain  Assessment & Plan:  PT referral for shoulder pain  Diclofenac 75mg po BID with 3 refills to trial for pain in wrist and shoulder  Shoulder exercises    Orders:  -     Ambulatory referral/consult to Physical/Occupational Therapy; Future; Expected date: 10/10/2023  -     diclofenac (VOLTAREN) 75 MG EC tablet; Take 1 tablet (75 mg total) by mouth 2 (two) times daily.  Dispense: 60 tablet; Refill: 3    2. Vitamin D deficiency  Assessment & Plan:  Vitamin d level today to reassess need for high dose vitamin d      Orders:  -     Vitamin D    3. Carpal tunnel syndrome, bilateral  Overview:  Formatting of this note might be different from the original.  Added automatically from request for surgery [id number]    Assessment & Plan:  Call ortho for FU since EMG study    Orders:  -     diclofenac (VOLTAREN) 75 MG EC tablet; Take 1 tablet (75 mg total) by mouth 2 (two) times daily.  Dispense: 60 tablet; Refill: 3    4. Diabetic polyneuropathy associated with type 2 diabetes mellitus  Overview:  Lab Results   Component Value Date    HGBA1C 6.0 05/02/2023         Assessment & Plan:  CMP, a1c today  Encouraged ACE/ARB/Statin according to guidelines.  Follow ADA Diet. Avoid soda, simple sweets, and limit rice/pasta/breads/starches.  Maintain healthy weight with goal BMI <30. Exercise 5 times per week for 30 minutes per day.  Stressed importance of daily foot exams.  Stressed importance of annual dilated eye exam.  Last foot exam: 10/3/23  Last eye exam:  6/2023  Last micro albumin: June 2023          5. Anxiety  Assessment & Plan:  Continue lexapro 5mg po daily      Obtain counseling resources, list given for patient to find.    Practice deep breathing or abdominal breathing exercises when anxiety occurs.  Exercise daily. Get sunlight daily.  Avoid caffeine, alcohol and stimulants.  Practice positive phrases and repeat throughout the day, yoga, lavender scents or Chamomile tea will help anxiety.  Set healthy boundaries,  avoid people and conversations that increase stress.  Reports any symptoms of suicidal or homicidal ideations immediately, if clinic is closed go to nearest emergency room.            6. Insomnia due to other mental disorder  Assessment & Plan:  Continue lexapro as directed  Avoid caffeine, alcohol and stimulants. Do not use illicit drugs.  Practice positive phrases and repeat throughout the day.  Try yoga, lavender scents or Chamomile tea to promote relaxation.  Set healthy boundaries, avoid people and conversations that increase stress.  Avoid caffeinated beverages after lunch  Avoid alcohol near bedtime (eg, late afternoon and evening)  Avoid smoking or other nicotine intake, particularly during the evening  Exercise regularly for at least 20 minutes, preferably more than four to five hours prior to bedtime  Avoid daytime naps, especially if they are longer than 20 to 30 minutes or occur late in the day  Resolve concerns or worries before bedtime  Try not to force sleep    Sleep Hygiene Techniques: Sleep hygiene refers to actions that tend to improve and maintain good sleep  Power down electronic devices at least one hour prior to bedtime.  Keep room dark; use eye mask or relaxation sound machine to promote rest.  Sleep as long as necessary to feel rested (usually seven to eight hours for adults) and then get out of bed  Maintain a regular sleep schedule, particularly a regular wake-up time in the morning        7. Primary hypertension  Overview:  BP Readings from Last 3 Encounters:   10/03/23 123/78   06/30/23 111/71   05/31/23 (!) 154/74         Assessment & Plan:  Stable on meds, chronic problem  Low Sodium Diet (Dash Diet - less than 2 grams of sodium per day).  Monitor Blood Pressure daily and log. Report any consistent numbers greater than 140/90.  Smoking Cessation encouraged to aid in BP reduction.  Maintain healthy weight with goal BMI <30. Exercise 30 minutes per day 5 days per week        8. Mixed  hyperlipidemia  Overview:  Lab Results   Component Value Date    CHOL 186 05/02/2023     Lab Results   Component Value Date    HDL 66 (H) 05/02/2023     No results found for: LDLCALC  No results found for: DLDL  Lab Results   Component Value Date    TRIG 103 05/02/2023       f1 No results found for: CHOLHDL      Assessment & Plan:  Chronic, stable  Stressed importance of dietary modifications. Follow a low cholesterol, low saturated fat diet with less that 200mg of cholesterol a day.  Avoid fried foods and high saturated fats (high saturated fats less than 7% of calories).  Add Flax Seed/Fish Oil supplements to diet. Increase dietary fiber.  Regular exercise can reduce LDL and raise HDL. Stressed importance of physical activity 5 times per week for 30 minutes per day.             9. Type 2 diabetes mellitus with other specified complication, without long-term current use of insulin  Overview:  Lab Results   Component Value Date    HGBA1C 6.0 05/02/2023     Metformin, continue  Last foot: 10/3/23  Fundus: June 2023  Microalbumin: June 2023    Assessment & Plan:  Continue metformin, refilled  Encouraged ACE/ARB/Statin according to guidelines.  Follow ADA Diet. Avoid soda, simple sweets, and limit rice/pasta/breads/starches.  Maintain healthy weight with goal BMI <30. Exercise 5 times per week for 30 minutes per day.  Stressed importance of daily foot exams.  Stressed importance of annual dilated eye exam.          Orders:  -     metFORMIN (GLUCOPHAGE) 500 MG tablet; Take 1 tablet (500 mg total) by mouth 2 (two) times daily with meals.  Dispense: 180 tablet; Refill: 1    10. BMI 33.0-33.9,adult  Overview:  Wt Readings from Last 3 Encounters:   10/03/23 0847 81.6 kg (180 lb)   06/30/23 0829 82.1 kg (181 lb)   05/31/23 0853 83.8 kg (184 lb 11.2 oz)         Assessment & Plan:  Stable  BMI Body mass index is 32.92 kg/m².   Goal BMI <30.  Exercise 5 times a week for 30 minutes per day.  Avoid soda, simple sugars, excessive  rice, potatoes or bread. Limit fast foods and fried foods.  Choose complex carbs in moderation (example: green vegetables, beans, oatmeal). Eat plenty of fresh fruits and vegetables with lean meats daily.  Do not skip meals. Eat a balanced portion size.  Avoid fad diets. Consider permanent healthy life style changes.           11. Hyperlipidemia, unspecified hyperlipidemia type  Overview:  Lab Results   Component Value Date    CHOL 186 05/02/2023     Lab Results   Component Value Date    HDL 66 (H) 05/02/2023     No results found for: LDLCALC  No results found for: DLDL  Lab Results   Component Value Date    TRIG 103 05/02/2023       f1 No results found for: CHOLHDL      Assessment & Plan:  Chronic, stable  Stressed importance of dietary modifications. Follow a low cholesterol, low saturated fat diet with less that 200mg of cholesterol a day.  Avoid fried foods and high saturated fats (high saturated fats less than 7% of calories).  Add Flax Seed/Fish Oil supplements to diet. Increase dietary fiber.  Regular exercise can reduce LDL and raise HDL. Stressed importance of physical activity 5 times per week for 30 minutes per day.           Orders:  -     atorvastatin (LIPITOR) 40 MG tablet; Take 1 tablet (40 mg total) by mouth every evening.  Dispense: 90 tablet; Refill: 1    12. Hypertension, unspecified type  Overview:  BP Readings from Last 3 Encounters:   10/03/23 123/78   06/30/23 111/71   05/31/23 (!) 154/74         Assessment & Plan:  Stable on meds, chronic problem  Low Sodium Diet (Dash Diet - less than 2 grams of sodium per day).  Monitor Blood Pressure daily and log. Report any consistent numbers greater than 140/90.  Smoking Cessation encouraged to aid in BP reduction.  Maintain healthy weight with goal BMI <30. Exercise 30 minutes per day 5 days per week      Orders:  -     amLODIPine (NORVASC) 10 MG tablet; Take 1 tablet (10 mg total) by mouth once daily.  Dispense: 90 tablet; Refill: 1    13. Dyspareunia  in female  -     Ambulatory referral/consult to Gynecology; Future; Expected date: 10/10/2023    14. Dyspareunia, female  Assessment & Plan:  Referral to GYN           Follow up in about 4 months (around 2/3/2024) for DM, shoulder pain, CTS, HTN, HLD.    No future appointments.     CRISTINO Diaz

## 2023-10-31 PROBLEM — M62.89 PELVIC FLOOR TENSION: Status: ACTIVE | Noted: 2023-10-31

## 2023-12-19 DIAGNOSIS — E55.9 VITAMIN D DEFICIENCY: ICD-10-CM

## 2023-12-19 RX ORDER — ERGOCALCIFEROL 1.25 MG/1
50000 CAPSULE ORAL
Qty: 12 CAPSULE | Refills: 0 | Status: SHIPPED | OUTPATIENT
Start: 2023-12-19 | End: 2024-01-04 | Stop reason: SDUPTHER

## 2024-01-04 DIAGNOSIS — E55.9 VITAMIN D DEFICIENCY: ICD-10-CM

## 2024-01-04 RX ORDER — ERGOCALCIFEROL 1.25 MG/1
50000 CAPSULE ORAL
Qty: 12 CAPSULE | Refills: 0 | Status: SHIPPED | OUTPATIENT
Start: 2024-01-04

## 2024-01-19 RX ORDER — DEXTROSE 4 G
1 TABLET,CHEWABLE ORAL 2 TIMES DAILY
Qty: 30 EACH | Refills: 0 | Status: SHIPPED | OUTPATIENT
Start: 2024-01-19

## 2024-01-19 NOTE — TELEPHONE ENCOUNTER
----- Message from Latha Clemons sent at 1/18/2024  1:49 PM CST -----  Regarding: Glucose meter  Pt is needing a new glucose meter, please send to [facility] [address].

## 2024-01-31 ENCOUNTER — OFFICE VISIT (OUTPATIENT)
Dept: FAMILY MEDICINE | Facility: CLINIC | Age: 54
End: 2024-01-31
Payer: MEDICAID

## 2024-01-31 VITALS
HEART RATE: 79 BPM | DIASTOLIC BLOOD PRESSURE: 84 MMHG | BODY MASS INDEX: 33.68 KG/M2 | OXYGEN SATURATION: 100 % | WEIGHT: 183 LBS | HEIGHT: 62 IN | SYSTOLIC BLOOD PRESSURE: 130 MMHG | RESPIRATION RATE: 18 BRPM | TEMPERATURE: 98 F

## 2024-01-31 DIAGNOSIS — M25.511 CHRONIC RIGHT SHOULDER PAIN: ICD-10-CM

## 2024-01-31 DIAGNOSIS — F99 INSOMNIA DUE TO OTHER MENTAL DISORDER: ICD-10-CM

## 2024-01-31 DIAGNOSIS — I10 PRIMARY HYPERTENSION: ICD-10-CM

## 2024-01-31 DIAGNOSIS — E78.2 MIXED HYPERLIPIDEMIA: ICD-10-CM

## 2024-01-31 DIAGNOSIS — E11.42 DIABETIC POLYNEUROPATHY ASSOCIATED WITH TYPE 2 DIABETES MELLITUS: Primary | ICD-10-CM

## 2024-01-31 DIAGNOSIS — F51.05 INSOMNIA DUE TO OTHER MENTAL DISORDER: ICD-10-CM

## 2024-01-31 DIAGNOSIS — G56.03 CARPAL TUNNEL SYNDROME, BILATERAL: ICD-10-CM

## 2024-01-31 DIAGNOSIS — F41.9 ANXIETY: ICD-10-CM

## 2024-01-31 DIAGNOSIS — Z12.31 ENCOUNTER FOR SCREENING MAMMOGRAM FOR MALIGNANT NEOPLASM OF BREAST: ICD-10-CM

## 2024-01-31 DIAGNOSIS — G89.29 CHRONIC RIGHT SHOULDER PAIN: ICD-10-CM

## 2024-01-31 PROBLEM — E11.9 TYPE 2 DIABETES MELLITUS, WITHOUT LONG-TERM CURRENT USE OF INSULIN: Status: RESOLVED | Noted: 2022-10-25 | Resolved: 2024-01-31

## 2024-01-31 LAB
ALBUMIN SERPL-MCNC: 3.8 G/DL (ref 3.5–5)
ALBUMIN/GLOB SERPL: 1 RATIO (ref 1.1–2)
ALP SERPL-CCNC: 75 UNIT/L (ref 40–150)
ALT SERPL-CCNC: 13 UNIT/L (ref 0–55)
APPEARANCE UR: ABNORMAL
AST SERPL-CCNC: 17 UNIT/L (ref 5–34)
BACTERIA #/AREA URNS AUTO: ABNORMAL /HPF
BASOPHILS # BLD AUTO: 0.03 X10(3)/MCL
BASOPHILS NFR BLD AUTO: 0.5 %
BILIRUB SERPL-MCNC: 0.5 MG/DL
BILIRUB UR QL STRIP.AUTO: NEGATIVE
BUN SERPL-MCNC: 10.2 MG/DL (ref 9.8–20.1)
CALCIUM SERPL-MCNC: 9.1 MG/DL (ref 8.4–10.2)
CHLORIDE SERPL-SCNC: 107 MMOL/L (ref 98–107)
CHOLEST SERPL-MCNC: 207 MG/DL
CHOLEST/HDLC SERPL: 3 {RATIO} (ref 0–5)
CO2 SERPL-SCNC: 25 MMOL/L (ref 22–29)
COLOR UR AUTO: YELLOW
CREAT SERPL-MCNC: 0.66 MG/DL (ref 0.55–1.02)
DEPRECATED CALCIDIOL+CALCIFEROL SERPL-MC: 33.1 NG/ML (ref 30–80)
EOSINOPHIL # BLD AUTO: 0.11 X10(3)/MCL (ref 0–0.9)
EOSINOPHIL NFR BLD AUTO: 1.7 %
ERYTHROCYTE [DISTWIDTH] IN BLOOD BY AUTOMATED COUNT: 12.2 % (ref 11.5–17)
EST. AVERAGE GLUCOSE BLD GHB EST-MCNC: 119.8 MG/DL
GFR SERPLBLD CREATININE-BSD FMLA CKD-EPI: >60 MLS/MIN/1.73/M2
GLOBULIN SER-MCNC: 3.7 GM/DL (ref 2.4–3.5)
GLUCOSE SERPL-MCNC: 143 MG/DL (ref 74–100)
GLUCOSE UR QL STRIP.AUTO: NORMAL
HAV IGM SERPL QL IA: NONREACTIVE
HBA1C MFR BLD: 5.8 %
HBV CORE IGM SERPL QL IA: NONREACTIVE
HBV SURFACE AG SERPL QL IA: NONREACTIVE
HCT VFR BLD AUTO: 40.2 % (ref 37–47)
HCV AB SERPL QL IA: NONREACTIVE
HDLC SERPL-MCNC: 63 MG/DL (ref 35–60)
HGB BLD-MCNC: 12.6 G/DL (ref 12–16)
HIV 1+2 AB+HIV1 P24 AG SERPL QL IA: NONREACTIVE
HYALINE CASTS #/AREA URNS LPF: ABNORMAL /LPF
IMM GRANULOCYTES # BLD AUTO: 0.01 X10(3)/MCL (ref 0–0.04)
IMM GRANULOCYTES NFR BLD AUTO: 0.2 %
KETONES UR QL STRIP.AUTO: NEGATIVE
LDLC SERPL CALC-MCNC: 120 MG/DL (ref 50–140)
LEUKOCYTE ESTERASE UR QL STRIP.AUTO: 25
LYMPHOCYTES # BLD AUTO: 3.09 X10(3)/MCL (ref 0.6–4.6)
LYMPHOCYTES NFR BLD AUTO: 49 %
MCH RBC QN AUTO: 26.7 PG (ref 27–31)
MCHC RBC AUTO-ENTMCNC: 31.3 G/DL (ref 33–36)
MCV RBC AUTO: 85.2 FL (ref 80–94)
MONOCYTES # BLD AUTO: 0.33 X10(3)/MCL (ref 0.1–1.3)
MONOCYTES NFR BLD AUTO: 5.2 %
MUCOUS THREADS URNS QL MICRO: ABNORMAL /LPF
NEUTROPHILS # BLD AUTO: 2.74 X10(3)/MCL (ref 2.1–9.2)
NEUTROPHILS NFR BLD AUTO: 43.4 %
NITRITE UR QL STRIP.AUTO: NEGATIVE
NRBC BLD AUTO-RTO: 0 %
PH UR STRIP.AUTO: 5.5 [PH]
PLATELET # BLD AUTO: 374 X10(3)/MCL (ref 130–400)
PMV BLD AUTO: 8.7 FL (ref 7.4–10.4)
POTASSIUM SERPL-SCNC: 3.8 MMOL/L (ref 3.5–5.1)
PROT SERPL-MCNC: 7.5 GM/DL (ref 6.4–8.3)
PROT UR QL STRIP.AUTO: ABNORMAL
RBC # BLD AUTO: 4.72 X10(6)/MCL (ref 4.2–5.4)
RBC #/AREA URNS AUTO: ABNORMAL /HPF
RBC UR QL AUTO: NEGATIVE
SODIUM SERPL-SCNC: 139 MMOL/L (ref 136–145)
SP GR UR STRIP.AUTO: 1.03 (ref 1–1.03)
SQUAMOUS #/AREA URNS LPF: ABNORMAL /HPF
T PALLIDUM AB SER QL: NONREACTIVE
T4 FREE SERPL-MCNC: 0.89 NG/DL (ref 0.7–1.48)
TRIGL SERPL-MCNC: 120 MG/DL (ref 37–140)
TSH SERPL-ACNC: 1.43 UIU/ML (ref 0.35–4.94)
UROBILINOGEN UR STRIP-ACNC: NORMAL
VIT B12 SERPL-MCNC: 679 PG/ML (ref 213–816)
VLDLC SERPL CALC-MCNC: 24 MG/DL
WBC # SPEC AUTO: 6.31 X10(3)/MCL (ref 4.5–11.5)
WBC #/AREA URNS AUTO: ABNORMAL /HPF

## 2024-01-31 PROCEDURE — 84443 ASSAY THYROID STIM HORMONE: CPT | Performed by: NURSE PRACTITIONER

## 2024-01-31 PROCEDURE — 80061 LIPID PANEL: CPT | Performed by: NURSE PRACTITIONER

## 2024-01-31 PROCEDURE — 80074 ACUTE HEPATITIS PANEL: CPT | Performed by: NURSE PRACTITIONER

## 2024-01-31 PROCEDURE — 1160F RVW MEDS BY RX/DR IN RCRD: CPT | Mod: CPTII,,, | Performed by: NURSE PRACTITIONER

## 2024-01-31 PROCEDURE — 87389 HIV-1 AG W/HIV-1&-2 AB AG IA: CPT | Performed by: NURSE PRACTITIONER

## 2024-01-31 PROCEDURE — 80053 COMPREHEN METABOLIC PANEL: CPT | Performed by: NURSE PRACTITIONER

## 2024-01-31 PROCEDURE — 82306 VITAMIN D 25 HYDROXY: CPT | Performed by: NURSE PRACTITIONER

## 2024-01-31 PROCEDURE — 83036 HEMOGLOBIN GLYCOSYLATED A1C: CPT | Performed by: NURSE PRACTITIONER

## 2024-01-31 PROCEDURE — 82607 VITAMIN B-12: CPT | Performed by: NURSE PRACTITIONER

## 2024-01-31 PROCEDURE — 1159F MED LIST DOCD IN RCRD: CPT | Mod: CPTII,,, | Performed by: NURSE PRACTITIONER

## 2024-01-31 PROCEDURE — 81001 URINALYSIS AUTO W/SCOPE: CPT | Performed by: NURSE PRACTITIONER

## 2024-01-31 PROCEDURE — 36415 COLL VENOUS BLD VENIPUNCTURE: CPT | Performed by: NURSE PRACTITIONER

## 2024-01-31 PROCEDURE — 99215 OFFICE O/P EST HI 40 MIN: CPT | Mod: PBBFAC,PN | Performed by: NURSE PRACTITIONER

## 2024-01-31 PROCEDURE — 86780 TREPONEMA PALLIDUM: CPT | Performed by: NURSE PRACTITIONER

## 2024-01-31 PROCEDURE — 3079F DIAST BP 80-89 MM HG: CPT | Mod: CPTII,,, | Performed by: NURSE PRACTITIONER

## 2024-01-31 PROCEDURE — 3008F BODY MASS INDEX DOCD: CPT | Mod: CPTII,,, | Performed by: NURSE PRACTITIONER

## 2024-01-31 PROCEDURE — 3075F SYST BP GE 130 - 139MM HG: CPT | Mod: CPTII,,, | Performed by: NURSE PRACTITIONER

## 2024-01-31 PROCEDURE — 84439 ASSAY OF FREE THYROXINE: CPT | Performed by: NURSE PRACTITIONER

## 2024-01-31 PROCEDURE — 85025 COMPLETE CBC W/AUTO DIFF WBC: CPT | Performed by: NURSE PRACTITIONER

## 2024-01-31 PROCEDURE — 99214 OFFICE O/P EST MOD 30 MIN: CPT | Mod: S$PBB,,, | Performed by: NURSE PRACTITIONER

## 2024-01-31 RX ORDER — DICLOFENAC SODIUM 75 MG/1
75 TABLET, DELAYED RELEASE ORAL 2 TIMES DAILY
Qty: 60 TABLET | Refills: 3 | Status: SHIPPED | OUTPATIENT
Start: 2024-01-31

## 2024-01-31 RX ORDER — ESCITALOPRAM OXALATE 20 MG/1
20 TABLET ORAL DAILY
Qty: 90 TABLET | Refills: 1 | Status: SHIPPED | OUTPATIENT
Start: 2024-01-31

## 2024-01-31 RX ORDER — GABAPENTIN 300 MG/1
300 CAPSULE ORAL 3 TIMES DAILY
Qty: 90 CAPSULE | Refills: 11 | Status: SHIPPED | OUTPATIENT
Start: 2024-01-31 | End: 2025-01-30

## 2024-01-31 NOTE — ASSESSMENT & PLAN NOTE
Continue Lexapro, take Melatonin.  Avoid caffeine, alcohol and stimulants. Do not use illicit drugs.  Practice positive phrases and repeat throughout the day.  Try yoga, lavender scents or Chamomile tea to promote relaxation.  Set healthy boundaries, avoid people and conversations that increase stress.  Avoid caffeinated beverages after lunch  Avoid alcohol near bedtime (eg, late afternoon and evening)  Avoid smoking or other nicotine intake, particularly during the evening  Exercise regularly for at least 20 minutes, preferably more than four to five hours prior to bedtime  Avoid daytime naps, especially if they are longer than 20 to 30 minutes or occur late in the day  Resolve concerns or worries before bedtime  Try not to force sleep    Sleep Hygiene Techniques: Sleep hygiene refers to actions that tend to improve and maintain good sleep  Power down electronic devices at least one hour prior to bedtime.  Keep room dark; use eye mask or relaxation sound machine to promote rest.  Sleep as long as necessary to feel rested (usually seven to eight hours for adults) and then get out of bed  Maintain a regular sleep schedule, particularly a regular wake-up time in the morning

## 2024-01-31 NOTE — ASSESSMENT & PLAN NOTE
Lexapro 20mg po daily, increase from Lexapro 10mg po daily    Obtain counseling resources, list given for patient to find.    Practice deep breathing or abdominal breathing exercises when anxiety occurs.  Exercise daily. Get sunlight daily.  Avoid caffeine, alcohol and stimulants.  Practice positive phrases and repeat throughout the day, yoga, lavender scents or Chamomile tea will help anxiety.  Set healthy boundaries, avoid people and conversations that increase stress.  Reports any symptoms of suicidal or homicidal ideations immediately, if clinic is closed go to nearest emergency room.

## 2024-01-31 NOTE — PROGRESS NOTES
Patient Name: Traci Petersen     : 1970    MRN: [id number]     Subjective:     Patient ID: Traci Petersen is a 53 y.o. female.    Chief Complaint:   Chief Complaint   Patient presents with    Follow-up     Pt is here for follow up. Pt requesting rx for new glucose meter. Pt informed it was submitted since 2024 as requested.        HPI: 24:  FU. Has seen podiatry in [place] and states that she can go to [place] for her next appointment.  Still has shoulder pain. Is in therapy still.  Has not seen ortho for shoulder. Has seen ortho for carpal tunnel.  States she went to Dr. Powell to have EMG study done as ordered by ortho.  Still having numbness to fingertips.  States would like to go up on her Gabapentin to 300mg po TID.  Also would like to increase Lexapro for anxiety.  Has cologard kit at home but has not collected yet.  MMG noted 23.  Is due for another one.      10/3/23: right wrist pain, right shoulder pain, FU diabetes, htn, high cholesterol  Hx frozen shoulder on right. Was seeing ortho in [place] but moved to [place].  Has not seen anyone here yet for her shoulder.    Wrist pain bilaterally-has not FU with ortho since her EMG study was ordered and completed. EMG study done in  showed bilateral neuropathy of wrists, no radiculopathy.  Requests PT referral for shoulder.   Upcoming appt with podiatry in January, will keep this.  BP stable today.    Insomnia and anxiety stable on lexapro.    DM: controlled at 6 last visit.  Due for foot today.  Only on Metformin.   States had MMG done in January in [place].  No records in chart.    Today, pt has new complaint of painful sex.  Is menopausal. States she has tried lubricant.  Feels dry in vagina.  Pain in lower abdomen after and during sex.        23:  1 month FU BP recheck.  Added HCTZ to regimen. BP today 111/71. C/o ongoing burning and stinging to bilateral feet coming up her legs.  It is worse at  night.  HTN: Restarted Amlodipine last visit because she had not been on Amlodipine since October last fill.  She had not been able to get her medications filled.  Today her BP is 111/71.   Carpal Tunnel Syndrome: States CSI injections did not work given by ortho.  Is going to start OT with [facility] physical therapy.       5/31/23:  Follow-up 2 weeks (supposed to be virtual appointment) to discuss labs and for med assessment of Lexapro, BP recheck.  MMG done at [facility]-will attempt to obtain records.    Vitamin D deficiency:  14.9 on last draw.      Bilateral carpal tunnel:  Pt has ortho appt as new patient 6/27/23.    She was seeing ortho in [place] for bilateral carpal tunnel, Pt is requesting a referral to Ortho in [place], told to find Therapy here in [place]. HX: She had surgery on right wrist for carpal tunnel release 1 year ago and is still having pain/issues.  Left wrist also painful and has not had any intervention on this one yet.  .  She was on Diclofenac for her wrist and it did not help according to her. She has not tried any other NSAIDs.  She has had CSI injections in past on right wrist but nothing done on left. There are no xrays in system on let wrist either.      c/o Foot pain left anterior foot and under foot: New problem.  She is asking for podiatrist in [place]- had appt in [place] but missed it. Encouraged to reschedule as we do not have resources in [place] for this specialty.  Hx type 2 DM, unsure of control.  Tpday she states has appointment with podiatry in January.    Insomnia: Lexapro 5mg po daily prescribed last visit and discussed trial of Melatonin last visit to treat anxiety first which could resolve insomnia. States has improved insomnia with Lexapro.  She previously had not tried any meds for this.  Believed it stemmed from Anxiety which was not well-treated.  Was not currently on medications for anxiety at last visit, had tried and failed Zoloft.     Anxiety:  Lexapro trialed last visit.  Helping somewhat-improved.  Previously has tried and failed Zoloft only.  She states she feels less anxious on meds and has had no major side effects with Lexapro.  Is amenable to increasing to 10 mg po daily.     Type 2 DM:  A1C 6.0 last visit, microalbumin WNL. Glucose 143 on labs. She is only on Metformin 500mg po BID with meals currently which has her diabetes well-controlled.  Due for Fundus?    HLD:  FLP 2 weeks ago showed: 05/02/23 09:15 Cholesterol: 186; HDL: 66 (H); LDL Cholesterol External: 99.00; Total Cholesterol/HDL Ratio: 3; Triglycerides: 103  Very Low Density Lipoprotein: 21. These values were without Lipitor for months.  She was on Lipitor but had been unable to obtain her medications due to lack of refills.  Restarted at last visit.      HTN: Restarted Amlodipine last visit because she had not been on Amlodipine since October last fill.  She had not been able to get her medications filled.  Today her BP is 154/74. She thinks due to pain and has been told this in past. Pain from right wrist today.               ROS:      Review of Systems   Musculoskeletal:  Positive for joint pain.   Neurological:  Positive for tingling and sensory change.   Psychiatric/Behavioral:  The patient is nervous/anxious.    All other systems reviewed and are negative.           History:     Past Medical History:   Diagnosis Date    Acquired absence of both cervix and uterus 01/02/1989    Carpal tunnel syndrome     High cholesterol     HTN (hypertension)     Type 2 diabetes mellitus, without long-term current use of insulin 10/25/2022    Lab Results  Component  Value  Date     HGBA1C  6.0  05/02/2023     Metformin, continue  Last foot: 10/3/23  Fundus: June 2023  Microalbumin: June 2023        Past Surgical History:   Procedure Laterality Date    CARPAL TUNNEL RELEASE Right     CHOLECYSTECTOMY      HYSTERECTOMY      TONSILLECTOMY      TUBAL LIGATION         Family History   Problem Relation Age  "of Onset    No Known Problems Mother     No Known Problems Father         Social History     Tobacco Use    Smoking status: Never    Smokeless tobacco: Never   Substance and Sexual Activity    Alcohol use: Yes     Comment: OCC    Drug use: No    Sexual activity: Yes       Current Outpatient Medications   Medication Instructions    amLODIPine (NORVASC) 10 mg, Oral, Daily    atorvastatin (LIPITOR) 40 mg, Oral, Nightly    blood-glucose meter Misc 1 each, Other, 2 times daily    diclofenac (VOLTAREN) 75 mg, Oral, 2 times daily    ergocalciferol (ERGOCALCIFEROL) 50,000 Units, Oral, Every 7 days    EScitalopram oxalate (LEXAPRO) 20 mg, Oral, Daily    gabapentin (NEURONTIN) 300 mg, Oral, 3 times daily    hydroCHLOROthiazide (HYDRODIURIL) 12.5 mg, Oral, Daily    metFORMIN (GLUCOPHAGE) 500 mg, Oral, 2 times daily with meals        Review of patient's allergies indicates:  No Known Allergies    Objective:     Visit Vitals  /84 (BP Location: Left arm, Patient Position: Sitting, BP Method: Large (Automatic))   Pulse 79   Temp 98.1 °F (36.7 °C) (Oral)   Resp 18   Ht 5' 2" (1.575 m)   Wt 83 kg (183 lb)   SpO2 100%   BMI 33.47 kg/m²       Physical Examination:     Physical Exam  Vitals and nursing note reviewed.   HENT:      Head: Normocephalic and atraumatic.   Cardiovascular:      Rate and Rhythm: Normal rate and regular rhythm.      Pulses: Normal pulses.      Heart sounds: Normal heart sounds.   Pulmonary:      Breath sounds: Normal breath sounds.   Musculoskeletal:         General: Normal range of motion.      Cervical back: Normal range of motion.   Skin:     General: Skin is warm and dry.   Neurological:      General: No focal deficit present.      Mental Status: She is alert and oriented to person, place, and time.   Psychiatric:         Mood and Affect: Mood normal.         Lab Results:     Chemistry:  Lab Results   Component Value Date     05/02/2023    K 3.7 05/02/2023    CHLORIDE 105 05/02/2023    BUN 9.3 " (L) 05/02/2023    CREATININE 0.74 05/02/2023    EGFRNORACEVR >60 05/02/2023    GLUCOSE 143 (H) 05/02/2023    CALCIUM 9.3 05/02/2023    ALKPHOS 74 05/02/2023    LABPROT 7.6 05/02/2023    ALBUMIN 3.9 05/02/2023    AST 16 05/02/2023    ALT 12 05/02/2023    NFXNFVLE61JI 23.2 (L) 10/03/2023    TSH 2.535 05/02/2023    FUAEQP7EXFE 0.92 05/02/2023        Lab Results   Component Value Date    HGBA1C 6.0 05/02/2023        Hematology:  Lab Results   Component Value Date    WBC 5.69 05/02/2023    HGB 13.4 05/02/2023    HCT 42.9 05/02/2023     05/02/2023       Lipid Panel:  Lab Results   Component Value Date    CHOL 186 05/02/2023    HDL 66 (H) 05/02/2023    LDL 99.00 05/02/2023    TRIG 103 05/02/2023    TOTALCHOLEST 3 05/02/2023        Urine:  Lab Results   Component Value Date    COLORUA Light-Yellow 05/31/2023    APPEARANCEUA Clear 05/31/2023    SGUA 1.019 05/31/2023    PHUA 6.5 05/31/2023    PROTEINUA Negative 05/31/2023    GLUCOSEUA Normal 05/31/2023    KETONESUA Negative 05/31/2023    BLOODUA Trace (A) 05/31/2023    NITRITESUA Negative 05/31/2023    LEUKOCYTESUR 75 (A) 05/31/2023    RBCUA 0-5 05/31/2023    WBCUA 0-5 05/31/2023    BACTERIA None Seen 05/31/2023    SQEPUA Many (A) 05/31/2023    HYALINECASTS None Seen 05/31/2023    CREATRANDUR 169.1 (H) 05/02/2023        Assessment:          ICD-10-CM ICD-9-CM   1. Diabetic polyneuropathy associated with type 2 diabetes mellitus  E11.42 250.60     357.2   2. Primary hypertension  I10 401.9   3. Mixed hyperlipidemia  E78.2 272.2   4. Encounter for screening mammogram for malignant neoplasm of breast  Z12.31 V76.12   5. Chronic right shoulder pain  M25.511 719.41    G89.29 338.29   6. Carpal tunnel syndrome, bilateral  G56.03 354.0   7. Anxiety  F41.9 300.00   8. Insomnia due to other mental disorder  F51.05 300.9    F99 327.02        Plan:     1. Diabetic polyneuropathy associated with type 2 diabetes mellitus  Overview:  Lab Results   Component Value Date    HGBA1C 6.0  05/02/2023     Last foot exam: 10/3/23  Last eye exam:  6/2023  Last micro albumin: June 2023    Assessment & Plan:  Inc Gabapentin to 300mg po TID    Encouraged ACE/ARB/Statin according to guidelines.  Follow ADA Diet. Avoid soda, simple sweets, and limit rice/pasta/breads/starches.  Maintain healthy weight with goal BMI <30. Exercise 5 times per week for 30 minutes per day.  Stressed importance of daily foot exams.  Stressed importance of annual dilated eye exam.        Orders:  -     Lipid Panel  -     CBC Auto Differential  -     Comprehensive Metabolic Panel  -     Cancel: Mammo Digital Screening Bilat; Future; Expected date: 01/31/2024  -     Urinalysis  -     Hemoglobin A1C  -     TSH  -     T4, Free  -     Vitamin D  -     Vitamin B12  -     Hepatitis Panel, Acute  -     HIV 1/2 Ag/Ab (4th Gen)  -     SYPHILIS ANTIBODY (WITH REFLEX RPR)  -     gabapentin (NEURONTIN) 300 MG capsule; Take 1 capsule (300 mg total) by mouth 3 (three) times daily.  Dispense: 90 capsule; Refill: 11    2. Primary hypertension  Overview:  BP Readings from Last 3 Encounters:   01/31/24 130/84   10/31/23 105/73   10/03/23 123/78         Assessment & Plan:  Stable on meds, chronic problem  Low Sodium Diet (Dash Diet - less than 2 grams of sodium per day).  Monitor Blood Pressure daily and log. Report any consistent numbers greater than 140/90.  Smoking Cessation encouraged to aid in BP reduction.  Maintain healthy weight with goal BMI <30. Exercise 30 minutes per day 5 days per week      Orders:  -     Lipid Panel  -     CBC Auto Differential  -     Comprehensive Metabolic Panel  -     Cancel: Mammo Digital Screening Bilat; Future; Expected date: 01/31/2024  -     Urinalysis  -     Hemoglobin A1C  -     TSH  -     T4, Free  -     Vitamin D  -     Vitamin B12  -     Hepatitis Panel, Acute  -     HIV 1/2 Ag/Ab (4th Gen)  -     SYPHILIS ANTIBODY (WITH REFLEX RPR)    3. Mixed hyperlipidemia  Overview:  Lab Results   Component Value Date     CHOL 186 05/02/2023     Lab Results   Component Value Date    HDL 66 (H) 05/02/2023     No results found for: LDLCALC  No results found for: DLDL  Lab Results   Component Value Date    TRIG 103 05/02/2023       f1 No results found for: CHOLHDL      Assessment & Plan:  FLP  Stressed importance of dietary modifications. Follow a low cholesterol, low saturated fat diet with less that 200mg of cholesterol a day.  Avoid fried foods and high saturated fats (high saturated fats less than 7% of calories).  Add Flax Seed/Fish Oil supplements to diet. Increase dietary fiber.  Regular exercise can reduce LDL and raise HDL. Stressed importance of physical activity 5 times per week for 30 minutes per day.     \    Orders:  -     Lipid Panel  -     CBC Auto Differential  -     Comprehensive Metabolic Panel  -     Cancel: Mammo Digital Screening Bilat; Future; Expected date: 01/31/2024  -     Urinalysis  -     Hemoglobin A1C  -     TSH  -     T4, Free  -     Vitamin D  -     Vitamin B12  -     Hepatitis Panel, Acute  -     HIV 1/2 Ag/Ab (4th Gen)  -     SYPHILIS ANTIBODY (WITH REFLEX RPR)    4. Encounter for screening mammogram for malignant neoplasm of breast  -     Cancel: Mammo Digital Screening Bilat; Future; Expected date: 01/31/2024  -     Mammo Digital Screening Bilat w/ Regino; Future; Expected date: 01/31/2024    5. Chronic right shoulder pain  Assessment & Plan:  Pain continues despite PT and conservative management with NSAIDs.  Needs refill of Diclofenac.    Refer to Ortho for continued shoulder pain.      Orders:  -     Ambulatory referral/consult to Orthopedics; Future; Expected date: 02/07/2024  -     diclofenac (VOLTAREN) 75 MG EC tablet; Take 1 tablet (75 mg total) by mouth 2 (two) times daily.  Dispense: 60 tablet; Refill: 3    6. Carpal tunnel syndrome, bilateral  Overview:  Formatting of this note might be different from the original.  Added automatically from request for surgery [id number]    Assessment &  Plan:  Keep all FU with Ortho.    Wear splints at night.    Orders:  -     diclofenac (VOLTAREN) 75 MG EC tablet; Take 1 tablet (75 mg total) by mouth 2 (two) times daily.  Dispense: 60 tablet; Refill: 3    7. Anxiety  Assessment & Plan:  Lexapro 20mg po daily, increase from Lexapro 10mg po daily    Obtain counseling resources, list given for patient to find.    Practice deep breathing or abdominal breathing exercises when anxiety occurs.  Exercise daily. Get sunlight daily.  Avoid caffeine, alcohol and stimulants.  Practice positive phrases and repeat throughout the day, yoga, lavender scents or Chamomile tea will help anxiety.  Set healthy boundaries, avoid people and conversations that increase stress.  Reports any symptoms of suicidal or homicidal ideations immediately, if clinic is closed go to nearest emergency room.        Orders:  -     EScitalopram oxalate (LEXAPRO) 20 MG tablet; Take 1 tablet (20 mg total) by mouth once daily.  Dispense: 90 tablet; Refill: 1    8. Insomnia due to other mental disorder  Assessment & Plan:  Continue Lexapro, take Melatonin.  Avoid caffeine, alcohol and stimulants. Do not use illicit drugs.  Practice positive phrases and repeat throughout the day.  Try yoga, lavender scents or Chamomile tea to promote relaxation.  Set healthy boundaries, avoid people and conversations that increase stress.  Avoid caffeinated beverages after lunch  Avoid alcohol near bedtime (eg, late afternoon and evening)  Avoid smoking or other nicotine intake, particularly during the evening  Exercise regularly for at least 20 minutes, preferably more than four to five hours prior to bedtime  Avoid daytime naps, especially if they are longer than 20 to 30 minutes or occur late in the day  Resolve concerns or worries before bedtime  Try not to force sleep    Sleep Hygiene Techniques: Sleep hygiene refers to actions that tend to improve and maintain good sleep  Power down electronic devices at least one hour  prior to bedtime.  Keep room dark; use eye mask or relaxation sound machine to promote rest.  Sleep as long as necessary to feel rested (usually seven to eight hours for adults) and then get out of bed  Maintain a regular sleep schedule, particularly a regular wake-up time in the morning             Follow up in about 6 months (around 7/31/2024) for shoulder pain, CTS, DM, anxiety.    Future Appointments   Date Time Provider Department Center   4/25/2024  9:00 AM Rex Shearer MD [facility]   7/31/2024  9:15 AM Fanny Scott FNP [facility]        CRISTINO Diaz

## 2024-01-31 NOTE — ASSESSMENT & PLAN NOTE
FLP  Stressed importance of dietary modifications. Follow a low cholesterol, low saturated fat diet with less that 200mg of cholesterol a day.  Avoid fried foods and high saturated fats (high saturated fats less than 7% of calories).  Add Flax Seed/Fish Oil supplements to diet. Increase dietary fiber.  Regular exercise can reduce LDL and raise HDL. Stressed importance of physical activity 5 times per week for 30 minutes per day.     \

## 2024-01-31 NOTE — ASSESSMENT & PLAN NOTE
Pain continues despite PT and conservative management with NSAIDs.  Needs refill of Diclofenac.    Refer to Ortho for continued shoulder pain.

## 2024-01-31 NOTE — ASSESSMENT & PLAN NOTE
Inc Gabapentin to 300mg po TID    Encouraged ACE/ARB/Statin according to guidelines.  Follow ADA Diet. Avoid soda, simple sweets, and limit rice/pasta/breads/starches.  Maintain healthy weight with goal BMI <30. Exercise 5 times per week for 30 minutes per day.  Stressed importance of daily foot exams.  Stressed importance of annual dilated eye exam.

## 2024-02-16 ENCOUNTER — DOCUMENTATION ONLY (OUTPATIENT)
Dept: FAMILY MEDICINE | Facility: CLINIC | Age: 54
End: 2024-02-16
Payer: MEDICAID

## 2024-04-04 ENCOUNTER — OFFICE VISIT (OUTPATIENT)
Dept: FAMILY MEDICINE | Facility: CLINIC | Age: 54
End: 2024-04-04
Payer: MEDICAID

## 2024-04-04 VITALS
BODY MASS INDEX: 34.23 KG/M2 | DIASTOLIC BLOOD PRESSURE: 80 MMHG | SYSTOLIC BLOOD PRESSURE: 123 MMHG | TEMPERATURE: 98 F | OXYGEN SATURATION: 98 % | WEIGHT: 186 LBS | HEIGHT: 62 IN | RESPIRATION RATE: 18 BRPM | HEART RATE: 60 BPM

## 2024-04-04 DIAGNOSIS — E11.42 DIABETIC POLYNEUROPATHY ASSOCIATED WITH TYPE 2 DIABETES MELLITUS: ICD-10-CM

## 2024-04-04 DIAGNOSIS — G56.03 CARPAL TUNNEL SYNDROME, BILATERAL: Primary | ICD-10-CM

## 2024-04-04 PROCEDURE — 1159F MED LIST DOCD IN RCRD: CPT | Mod: CPTII,,, | Performed by: NURSE PRACTITIONER

## 2024-04-04 PROCEDURE — 96372 THER/PROPH/DIAG INJ SC/IM: CPT | Mod: PBBFAC,PN

## 2024-04-04 PROCEDURE — 99214 OFFICE O/P EST MOD 30 MIN: CPT | Mod: S$PBB,,, | Performed by: NURSE PRACTITIONER

## 2024-04-04 PROCEDURE — 3079F DIAST BP 80-89 MM HG: CPT | Mod: CPTII,,, | Performed by: NURSE PRACTITIONER

## 2024-04-04 PROCEDURE — 3008F BODY MASS INDEX DOCD: CPT | Mod: CPTII,,, | Performed by: NURSE PRACTITIONER

## 2024-04-04 PROCEDURE — 3074F SYST BP LT 130 MM HG: CPT | Mod: CPTII,,, | Performed by: NURSE PRACTITIONER

## 2024-04-04 PROCEDURE — 1160F RVW MEDS BY RX/DR IN RCRD: CPT | Mod: CPTII,,, | Performed by: NURSE PRACTITIONER

## 2024-04-04 PROCEDURE — 3044F HG A1C LEVEL LT 7.0%: CPT | Mod: CPTII,,, | Performed by: NURSE PRACTITIONER

## 2024-04-04 PROCEDURE — 99215 OFFICE O/P EST HI 40 MIN: CPT | Mod: PBBFAC,PN | Performed by: NURSE PRACTITIONER

## 2024-04-04 RX ORDER — KETOROLAC TROMETHAMINE 30 MG/ML
30 INJECTION, SOLUTION INTRAMUSCULAR; INTRAVENOUS ONCE
Status: COMPLETED | OUTPATIENT
Start: 2024-04-04 | End: 2024-04-04

## 2024-04-04 RX ADMIN — KETOROLAC TROMETHAMINE 30 MG: 30 INJECTION, SOLUTION INTRAMUSCULAR; INTRAVENOUS at 07:04

## 2024-04-04 NOTE — ASSESSMENT & PLAN NOTE
Bilateral wrist splints rx given to take to .   Toradol 30mg IM now in clinic.  Given crackers to eat prior to injection  Pt is on Diclofenac already and we increased her Gabapentin last visit. She declined increase today  Referral to PT for bilateral wrist pain and foot pain.     Advised her to call ortho and schedule an appointment to be seen.

## 2024-04-04 NOTE — PROGRESS NOTES
Patient Name: Traci Petersen     : 1970    MRN: [id number]     Subjective:     Patient ID: Traci Petersen is a 53 y.o. female.    Chief Complaint:   Chief Complaint   Patient presents with    Follow-up     Pt here with increased bilateral hand and foot pain @ 7/10#'s scale        HPI: 24:  Pt here with increased bilateral hand and foot pain @ 7/10#'s scale.  She sees podiatry and orthopedics. States she just saw podiatry and is not able to make another appointment because the MD is so booked up?  States pain is worse. She has already had surgery on her right wrist for carpal tunnel, still working. Pain increasing.  She has had EMG study  by Dr. Powell that is in system showing neuropathy.  She has not reached out to Ortho clinic regarding her worsening right wrist pain. She states she has no wrist splints at home.  Rx were written at her last visit for splints to wear at night.  She did not obtain them.  Still having numbness to fingertips and is worsening in hand/wrist. Right is worse than left.       24:  FU. Has seen podiatry in [place] and states that she can go to [place] for her next appointment.  Still has shoulder pain. Is in therapy still.  Has not seen ortho for shoulder. Has seen ortho for carpal tunnel.  States she went to Dr. Powell to have EMG study done as ordered by ortho.  Still having numbness to fingertips.  States would like to go up on her Gabapentin to 300mg po TID.  Also would like to increase Lexapro for anxiety.  Has cologard kit at home but has not collected yet.  MMG noted 23.  Is due for another one.      10/3/23: right wrist pain, right shoulder pain, FU diabetes, htn, high cholesterol  Hx frozen shoulder on right. Was seeing ortho in [place] but moved to [place].  Has not seen anyone here yet for her shoulder.    Wrist pain bilaterally-has not FU with ortho since her EMG study was ordered and completed. EMG study done in  showed bilateral  neuropathy of wrists, no radiculopathy.  Requests PT referral for shoulder.   Upcoming appt with podiatry in January, will keep this.  BP stable today.    Insomnia and anxiety stable on lexapro.    DM: controlled at 6 last visit.  Due for foot today.  Only on Metformin.   States had MMG done in January in [place].  No records in chart.    Today, pt has new complaint of painful sex.  Is menopausal. States she has tried lubricant.  Feels dry in vagina.  Pain in lower abdomen after and during sex.        6/30/23:  1 month FU BP recheck.  Added HCTZ to regimen. BP today 111/71. C/o ongoing burning and stinging to bilateral feet coming up her legs.  It is worse at night.  HTN: Restarted Amlodipine last visit because she had not been on Amlodipine since October last fill.  She had not been able to get her medications filled.  Today her BP is 111/71.   Carpal Tunnel Syndrome: States CSI injections did not work given by ortho.  Is going to start OT with [facility].       5/31/23:  Follow-up 2 weeks (supposed to be virtual appointment) to discuss labs and for med assessment of Lexapro, BP recheck.  MMG done at [facility]-will attempt to obtain records.    Vitamin D deficiency:  14.9 on last draw.      Bilateral carpal tunnel:  Pt has ortho appt as new patient 6/27/23.    She was seeing ortho in [place] for bilateral carpal tunnel, Pt is requesting a referral to Ortho in [place], told to find Therapy here in [place]. HX: She had surgery on right wrist for carpal tunnel release 1 year ago and is still having pain/issues.  Left wrist also painful and has not had any intervention on this one yet.  .  She was on Diclofenac for her wrist and it did not help according to her. She has not tried any other NSAIDs.  She has had CSI injections in past on right wrist but nothing done on left. There are no xrays in system on let wrist either.      c/o Foot pain left anterior foot and under foot: New problem.   She is asking for podiatrist in [place]- had appt in [place] but missed it. Encouraged to reschedule as we do not have resources in [place] for this specialty.  Hx type 2 DM, unsure of control.  Tpday she states has appointment with podiatry in January.    Insomnia: Lexapro 5mg po daily prescribed last visit and discussed trial of Melatonin last visit to treat anxiety first which could resolve insomnia. States has improved insomnia with Lexapro.  She previously had not tried any meds for this.  Believed it stemmed from Anxiety which was not well-treated.  Was not currently on medications for anxiety at last visit, had tried and failed Zoloft.     Anxiety: Lexapro trialed last visit.  Helping somewhat-improved.  Previously has tried and failed Zoloft only.  She states she feels less anxious on meds and has had no major side effects with Lexapro.  Is amenable to increasing to 10 mg po daily.     Type 2 DM:  A1C 6.0 last visit, microalbumin WNL. Glucose 143 on labs. She is only on Metformin 500mg po BID with meals currently which has her diabetes well-controlled.  Due for Fundus?    HLD:  FLP 2 weeks ago showed: 05/02/23 09:15 Cholesterol: 186; HDL: 66 (H); LDL Cholesterol External: 99.00; Total Cholesterol/HDL Ratio: 3; Triglycerides: 103  Very Low Density Lipoprotein: 21. These values were without Lipitor for months.  She was on Lipitor but had been unable to obtain her medications due to lack of refills.  Restarted at last visit.      HTN: Restarted Amlodipine last visit because she had not been on Amlodipine since October last fill.  She had not been able to get her medications filled.  Today her BP is 154/74. She thinks due to pain and has been told this in past. Pain from right wrist today.             ROS:      Review of Systems   Musculoskeletal:  Positive for joint pain and myalgias.   All other systems reviewed and are negative.           History:     Past Medical History:   Diagnosis Date    Acquired  "absence of both cervix and uterus 01/02/1989    Carpal tunnel syndrome     High cholesterol     HTN (hypertension)     Type 2 diabetes mellitus, without long-term current use of insulin 10/25/2022    Lab Results  Component  Value  Date     HGBA1C  6.0  05/02/2023     Metformin, continue  Last foot: 10/3/23  Fundus: June 2023  Microalbumin: June 2023        Past Surgical History:   Procedure Laterality Date    CARPAL TUNNEL RELEASE Right     CHOLECYSTECTOMY      HYSTERECTOMY      TONSILLECTOMY      TUBAL LIGATION         Family History   Problem Relation Age of Onset    No Known Problems Mother     No Known Problems Father         Social History     Tobacco Use    Smoking status: Never    Smokeless tobacco: Never   Substance and Sexual Activity    Alcohol use: Yes     Comment: OCC    Drug use: No    Sexual activity: Yes       Current Outpatient Medications   Medication Instructions    amLODIPine (NORVASC) 10 mg, Oral, Daily    atorvastatin (LIPITOR) 40 mg, Oral, Nightly    blood-glucose meter Misc 1 each, Other, 2 times daily    diclofenac (VOLTAREN) 75 mg, Oral, 2 times daily    ergocalciferol (ERGOCALCIFEROL) 50,000 Units, Oral, Every 7 days    EScitalopram oxalate (LEXAPRO) 20 mg, Oral, Daily    gabapentin (NEURONTIN) 300 mg, Oral, 3 times daily    hydroCHLOROthiazide (HYDRODIURIL) 12.5 mg, Oral, Daily    metFORMIN (GLUCOPHAGE) 500 mg, Oral, 2 times daily with meals        Review of patient's allergies indicates:  No Known Allergies    Objective:     Visit Vitals  /80 (BP Location: Left arm, Patient Position: Sitting, BP Method: Large (Automatic))   Pulse 60   Temp 97.8 °F (36.6 °C) (Oral)   Resp 18   Ht 5' 2" (1.575 m)   Wt 84.4 kg (186 lb)   SpO2 98%   BMI 34.02 kg/m²       Physical Examination:     Physical Exam  Vitals and nursing note reviewed.   HENT:      Head: Normocephalic and atraumatic.   Cardiovascular:      Rate and Rhythm: Normal rate and regular rhythm.      Pulses: Normal pulses.           " Dorsalis pedis pulses are 2+ on the right side and 2+ on the left side.        Posterior tibial pulses are 2+ on the right side and 2+ on the left side.      Heart sounds: Normal heart sounds.   Pulmonary:      Breath sounds: Normal breath sounds.   Musculoskeletal:         General: Normal range of motion.      Right wrist: Tenderness present.      Left wrist: Tenderness present.      Cervical back: Normal range of motion.      Right foot: No deformity.      Left foot: No deformity.      Comments: +Tinel and Phalen signs bilaterally   Feet:      Right foot:      Skin integrity: Skin integrity normal.      Left foot:      Skin integrity: Skin integrity normal.   Skin:     General: Skin is warm and dry.   Neurological:      General: No focal deficit present.      Mental Status: She is alert and oriented to person, place, and time.   Psychiatric:         Mood and Affect: Mood normal.         Lab Results:     Chemistry:  Lab Results   Component Value Date     01/31/2024    K 3.8 01/31/2024    CHLORIDE 107 01/31/2024    BUN 10.2 01/31/2024    CREATININE 0.66 01/31/2024    EGFRNORACEVR >60 01/31/2024    GLUCOSE 143 (H) 01/31/2024    CALCIUM 9.1 01/31/2024    ALKPHOS 75 01/31/2024    LABPROT 7.5 01/31/2024    ALBUMIN 3.8 01/31/2024    AST 17 01/31/2024    ALT 13 01/31/2024    SCXMNVPT38MW 33.1 01/31/2024    TSH 1.427 01/31/2024    YZOEEP9EATS 0.89 01/31/2024        Lab Results   Component Value Date    HGBA1C 5.8 01/31/2024        Hematology:  Lab Results   Component Value Date    WBC 6.31 01/31/2024    HGB 12.6 01/31/2024    HCT 40.2 01/31/2024     01/31/2024       Lipid Panel:  Lab Results   Component Value Date    CHOL 207 (H) 01/31/2024    HDL 63 (H) 01/31/2024    .00 01/31/2024    TRIG 120 01/31/2024    TOTALCHOLEST 3 01/31/2024        Urine:  Lab Results   Component Value Date    COLORUA Yellow 01/31/2024    APPEARANCEUA Turbid (A) 01/31/2024    SGUA 1.031 (H) 01/31/2024    PHUA 5.5 01/31/2024     PROTEINUA 1+ (A) 01/31/2024    GLUCOSEUA Normal 01/31/2024    KETONESUA Negative 01/31/2024    BLOODUA Negative 01/31/2024    NITRITESUA Negative 01/31/2024    LEUKOCYTESUR 25 (A) 01/31/2024    RBCUA 0-5 01/31/2024    WBCUA 0-5 01/31/2024    BACTERIA Trace (A) 01/31/2024    SQEPUA Many (A) 01/31/2024    HYALINECASTS 0-2 (A) 01/31/2024    CREATRANDUR 169.1 (H) 05/02/2023        Assessment:          ICD-10-CM ICD-9-CM   1. Carpal tunnel syndrome, bilateral  G56.03 354.0   2. Diabetic polyneuropathy associated with type 2 diabetes mellitus  E11.42 250.60     357.2        Plan:     1. Carpal tunnel syndrome, bilateral  Overview:  Formatting of this note might be different from the original.  Added automatically from request for surgery [id number]    Assessment & Plan:  Bilateral wrist splints rx given to take to .   Toradol 30mg IM now in clinic.  Given crackers to eat prior to injection  Pt is on Diclofenac already and we increased her Gabapentin last visit. She declined increase today  Referral to PT for bilateral wrist pain and foot pain.     Advised her to call ortho and schedule an appointment to be seen.     Orders:  -     WRIST BRACE FOR HOME USE  -     WRIST BRACE FOR HOME USE  -     ketorolac injection 30 mg  -     Ambulatory referral/consult to Physical/Occupational Therapy; Future; Expected date: 04/11/2024    2. Diabetic polyneuropathy associated with type 2 diabetes mellitus  Overview:  Lab Results   Component Value Date    HGBA1C 6.0 05/02/2023     Last foot exam: 10/3/23  Last eye exam:  6/2023  Last micro albumin: June 2023    Assessment & Plan:  Instructed pt to call Podiatry for another appointment.  She will be referred to PT today to see if there is anything that can be done from a therapy standpoint on her feet.  We did discuss today that this is neuropathy in feet just like hands and that she is on the right medication for the type of pain she is experiencing.      Orders:  -     Ambulatory  referral/consult to Physical/Occupational Therapy; Future; Expected date: 04/11/2024         Follow up in about 4 months (around 7/31/2024) for Wellness.    Future Appointments   Date Time Provider Department Center   4/25/2024 10:40 AM Rex Shearer MD [facility]   7/31/2024  9:15 AM Fanny Scott FNP [facility]        CRISTINO Diaz

## 2024-04-04 NOTE — ASSESSMENT & PLAN NOTE
Instructed pt to call Podiatry for another appointment.  She will be referred to PT today to see if there is anything that can be done from a therapy standpoint on her feet.  We did discuss today that this is neuropathy in feet just like hands and that she is on the right medication for the type of pain she is experiencing.

## 2024-04-05 ENCOUNTER — TELEPHONE (OUTPATIENT)
Dept: FAMILY MEDICINE | Facility: CLINIC | Age: 54
End: 2024-04-05
Payer: MEDICAID

## 2024-04-25 ENCOUNTER — OFFICE VISIT (OUTPATIENT)
Dept: ORTHOPEDICS | Facility: CLINIC | Age: 54
End: 2024-04-25
Payer: MEDICAID

## 2024-04-25 VITALS
SYSTOLIC BLOOD PRESSURE: 107 MMHG | WEIGHT: 184.06 LBS | BODY MASS INDEX: 33.87 KG/M2 | TEMPERATURE: 98 F | HEIGHT: 62 IN | HEART RATE: 61 BPM | DIASTOLIC BLOOD PRESSURE: 73 MMHG

## 2024-04-25 DIAGNOSIS — G56.03 CARPAL TUNNEL SYNDROME, BILATERAL: Primary | ICD-10-CM

## 2024-04-25 PROCEDURE — 3008F BODY MASS INDEX DOCD: CPT | Mod: CPTII,,, | Performed by: NURSE PRACTITIONER

## 2024-04-25 PROCEDURE — 3074F SYST BP LT 130 MM HG: CPT | Mod: CPTII,,, | Performed by: NURSE PRACTITIONER

## 2024-04-25 PROCEDURE — 1160F RVW MEDS BY RX/DR IN RCRD: CPT | Mod: CPTII,,, | Performed by: NURSE PRACTITIONER

## 2024-04-25 PROCEDURE — 99214 OFFICE O/P EST MOD 30 MIN: CPT | Mod: S$PBB,,, | Performed by: NURSE PRACTITIONER

## 2024-04-25 PROCEDURE — 3044F HG A1C LEVEL LT 7.0%: CPT | Mod: CPTII,,, | Performed by: NURSE PRACTITIONER

## 2024-04-25 PROCEDURE — 1159F MED LIST DOCD IN RCRD: CPT | Mod: CPTII,,, | Performed by: NURSE PRACTITIONER

## 2024-04-25 PROCEDURE — 3078F DIAST BP <80 MM HG: CPT | Mod: CPTII,,, | Performed by: NURSE PRACTITIONER

## 2024-04-25 PROCEDURE — 99214 OFFICE O/P EST MOD 30 MIN: CPT | Mod: PBBFAC | Performed by: NURSE PRACTITIONER

## 2024-04-25 NOTE — PROGRESS NOTES
[facility] - [facility]  Subjective:   PATIENT ID: Traci Petersen is a 53 y.o. female. Non-smoker. Employment HX: , currently employed.    Seen [facility] ortho for same DX since n/a.   CHIEF COMPLAINT: Carpal Tunnel of the Right Wrist (Here for Bilat CTS . Pain Level 10 today. And constant) and Carpal Tunnel of the Left Wrist    HPI:    Bilateral R = L numbness, tingling in palm and of first 1-3 digits. Left dominate  Injury/ Surgeries r/t CC: no HX of prior significant joint injury  Onset: several years ago fluctuates   Modifying Factors: exacerbated by overuse, driving; improved with shaking of hands  Associated Symptoms: hand weakness w/o dropping items, endorses sleep disturbance, endorses shooting pain into arm, denies neck pain  Activity: sedentary with light activity and pain moderately interferes with ADLs .   Previous Treatments: nocturnal wrist splint, HEP , OTC medications: ibuprofen, RX medications: diclofenac, gabapentin, CSI since 2023 last injection 6/7/23, and RX OT completed 8 wks/ 2 xs per week d/c'd 11/2022  with adequate relief but symptoms returning  PMH:  DX complex regional pain syndrome  Family History: + OA    NOTE: Follow up, seen by me since 6/7/23  referred for bilateral carpal tunnel syndrome with significant history of conservative treatments.  Symptoms affecting ADLs.  CSI given 6/7/23 w/o relief.  History of carpal tunnel release 2022 in [place], states both are getting worse over time.  Desires surgical treatment options.     Current Outpatient Medications:     amLODIPine (NORVASC) 10 MG tablet, Take 1 tablet (10 mg total) by mouth once daily., Disp: 90 tablet, Rfl: 1    atorvastatin (LIPITOR) 40 MG tablet, Take 1 tablet (40 mg total) by mouth every evening., Disp: 90 tablet, Rfl: 1    blood-glucose meter Misc, 1 each by Other route 2 (two) times a day., Disp: 30 each, Rfl: 0    diclofenac (VOLTAREN) 75 MG EC tablet, Take  "1 tablet (75 mg total) by mouth 2 (two) times daily., Disp: 60 tablet, Rfl: 3    ergocalciferol (ERGOCALCIFEROL) 50,000 unit Cap, Take 1 capsule (50,000 Units total) by mouth every 7 days., Disp: 12 capsule, Rfl: 0    EScitalopram oxalate (LEXAPRO) 20 MG tablet, Take 1 tablet (20 mg total) by mouth once daily., Disp: 90 tablet, Rfl: 1    gabapentin (NEURONTIN) 300 MG capsule, Take 1 capsule (300 mg total) by mouth 3 (three) times daily., Disp: 90 capsule, Rfl: 11    hydroCHLOROthiazide (HYDRODIURIL) 12.5 MG Tab, Take 1 tablet (12.5 mg total) by mouth once daily., Disp: 30 tablet, Rfl: 11    metFORMIN (GLUCOPHAGE) 500 MG tablet, Take 1 tablet (500 mg total) by mouth 2 (two) times daily with meals., Disp: 180 tablet, Rfl: 1  Review of patient's allergies indicates:  No Known Allergies    REVIEW OF SYSTEMS:  A ten-point review of systems was performed and is negative, except as mentioned above  Objective:   Body mass index is 33.67 kg/m².   Vitals:    04/25/24 0757 04/25/24 0758   BP:  107/73   Pulse:  61   Temp:  98 °F (36.7 °C)   TempSrc:  Oral   Weight: 83.5 kg (184 lb 1.4 oz)    Height: 5' 2" (1.575 m)    PainSc:  10-Worst pain ever      MSK Hand/ Wrist Exam  General:  no apparent distress, no pain indicators,  obesity  Inspection:  LEFT HAND RIGHT HAND   no joint swelling, no mass/ cyst noted, no soft tissue swelling, no erythema, no lesions, no contusion, no gross deformities, no nodules, no atrophy of thenar or hypothenar eminence,  no scars, no discoloration noted no joint swelling, no mass/ cyst noted, no soft tissue swelling, no erythema, no lesions, no contusion, no gross deformities, no nodules, no atrophy of thenar or hypothenar eminence,  no scars, no discoloration noted   Palpation:     LEFT HAND RIGHT HAND   no joint tenderness, normal temperature, no palpable palm nodules, no finger joint tenderness or crepitus, no active triggering or locking of digits and no tenderness of digital flexor tendons, no " tenderness of thenar and hypothenar eminence no joint tenderness, normal temperature, no palpable palm nodules, no finger joint tenderness or crepitus, no active triggering or locking of digits and no tenderness of digital flexor tendons, no tenderness of thenar and hypothenar eminence     ROM LEFT HAND RIGHT HAND   Wrist Flexion / Extension   80 / 75 80 / 75   Wrist Radial / Ulnar Deviation   15 / 30 15 / 30   Thumb CMC/ MCP/ IP WNL w/o pain WNL w/o pain   Finger MCP/PIP/DIP WNL w/o pain WNL w/o pain      Strength LEFT HAND RIGHT HAND    4 / 5 w/o pain 4 / 5 w/o pain       Special Testing: L+ L-- R+ R-- Not Tested     Carpal Tunnel Syndrome         Phalen [x] [] [x] [] []    Cj Carpal Compression [x] [] [x] [] []    Cubital Tunnel Syndrome         Tinel's Test @ elbow [] [x] [] [x] []    De Quervain's Tenosynovitis         Finkelstein Test [] [x] [] [x] []    Ligament Injury         Scaphoid Shift Test [] [] [] [] [x]    Lunotriquetral Shuck Test [] [] [] [] [x]    UCL Ligament Thumb Test [] [] [] [] [x]    Ulnar Fovea Sign [] [] [] [] [x]    Nerve Injury  Radial Nerve  IP joint extension against resistance intact   Median Nerve Palmar abduction of thumb intact   Anterior Interosseous Branch OK sign intact   Ulnar Nerve   Abduction of fingers against resistance intact, Froment's sign negative   Neuro/ Vascular: Spurling's Test positive, Intact to light touch, capillary refill 2 seconds,  radial pulse equal 2+, 2 point discrimination intact, Peter's test intact  Psych: Awake, alert, oriented, normal mood and affect  Lymphatic: No LAD  Skin/ Soft Tissue: no rash, skin intact  Cardio: no edema, vascular integrity noted  Resp: no increased respiratory effort noted   Assessment:   IMAGING: XR noted in EMR dated 5/2/23 & 6/7/23  3 views of bilateral  wrist  reviewed and independently interpreted by me with no acute findings noted. Right radiocarpal DJD with soft tissue foreign body noted.  Radiologist findings  reviewed.  Findings discussed with patient today.    EXAMINATION: Three views right wrist 6/7/23  CLINICAL HISTORY:  Carpal tunnel  COMPARISON:  None  FINDINGS:  No fracture, dislocation or aggressive osseous process.  Radiocarpal joint space narrowing is present.  Bones are demineralized.  Linear metallic foreign body is in the ventral soft tissues measuring 3 mm between the 2nd and 3rd metacarpals.  Impression:  Degenerative changes with a foreign body in the ventral soft tissues    EXAMINATION: XR WRIST COMPLETE 3 VIEWS LEFT 5/2/23  CLINICAL HISTORY:  Carpal tunnel syndrome, bilateral upper limbs left wrist carpal tunnel;  COMPARISON:  None.  FINDINGS:  There is no acute fracture or malalignment.  The soft tissues are unremarkable.  Impression:  No acute bony abnormality.    EMG study: dated 6/22/23 per Edd Frazier This is an abnormal study.  There is electrodiagnostic evidence of bilateral median neuropathy at the wrist carpal tunnel syndrome, moderate on right and left mild    Labs:    Hemoglobin A1c   Date Value Ref Range Status   01/31/2024 5.8 <=7.0 % Final   05/02/2023 6.0 <=7.0 % Final      EMR REVIEW: completed with noted prior visit 6/27/23 reviewed.  DX & Plan:   DIAGNOSIS: Moderate exacerbation of chronic Bilateral L < R Carpal tunnel syndrome mild left, moderate right    1. Carpal tunnel syndrome, bilateral    2. BMI 33.0-33.9,adult       TREATMENT PLAN:     Treatment Plan: Patient will be referred to ortho res. for surgical eval. and treatment of carpal tunnel s/t failed conservative treatments.  Ongoing education about DX and treatment recommendations including conservative treatments of daily HEP for carpal tunnel, nocturnal splinting of wrist PRN and OTC NSAID as needed according to label instructions if able to tolerate.   Procedure: n/a  RX Medications: continue medications as RX per PCP .     RTC: next available appt. with ortho res.   W/ [facility]       Payton Lozoya  FNP [facility]  Procedure Note:   None  Time Based Billing   Total Time Spent with Patient: 30 minutes .  Visit Start Time: 0800  10 minutes spent prior to visit reviewing EMR, prior labs and x-rays.  10 minutes spent in visit with patient face-to-face time completing exam, obtaining history, educating on DX and treatment plan.  10 minutes spent after visit completing EMR documentation.   Visit End Time: 0830     Please be aware that this note has been generated with the assistance of [employer] voice-to-text.  Please excuse any spelling or grammatical errors.

## 2024-05-06 ENCOUNTER — OFFICE VISIT (OUTPATIENT)
Dept: ORTHOPEDICS | Facility: CLINIC | Age: 54
End: 2024-05-06
Payer: MEDICAID

## 2024-05-06 ENCOUNTER — HOSPITAL ENCOUNTER (OUTPATIENT)
Dept: RADIOLOGY | Facility: HOSPITAL | Age: 54
Discharge: HOME | End: 2024-05-06
Attending: STUDENT IN AN ORGANIZED HEALTH CARE EDUCATION/TRAINING PROGRAM
Payer: MEDICAID

## 2024-05-06 VITALS
SYSTOLIC BLOOD PRESSURE: 122 MMHG | DIASTOLIC BLOOD PRESSURE: 81 MMHG | BODY MASS INDEX: 33.98 KG/M2 | HEIGHT: 62 IN | WEIGHT: 184.63 LBS | HEART RATE: 71 BPM | TEMPERATURE: 98 F

## 2024-05-06 DIAGNOSIS — M79.641 RIGHT HAND PAIN: ICD-10-CM

## 2024-05-06 DIAGNOSIS — M79.641 RIGHT HAND PAIN: Primary | ICD-10-CM

## 2024-05-06 PROCEDURE — 99213 OFFICE O/P EST LOW 20 MIN: CPT | Mod: PBBFAC,25

## 2024-05-06 PROCEDURE — 73130 X-RAY EXAM OF HAND: CPT | Mod: TC,LT

## 2024-05-06 PROCEDURE — 99204 OFFICE O/P NEW MOD 45 MIN: CPT | Mod: S$PBB,25,, | Performed by: STUDENT IN AN ORGANIZED HEALTH CARE EDUCATION/TRAINING PROGRAM

## 2024-05-06 PROCEDURE — 3044F HG A1C LEVEL LT 7.0%: CPT | Mod: CPTII,,, | Performed by: STUDENT IN AN ORGANIZED HEALTH CARE EDUCATION/TRAINING PROGRAM

## 2024-05-06 PROCEDURE — 20526 THER INJECTION CARP TUNNEL: CPT | Mod: PBBFAC,LT | Performed by: STUDENT IN AN ORGANIZED HEALTH CARE EDUCATION/TRAINING PROGRAM

## 2024-05-06 PROCEDURE — 3008F BODY MASS INDEX DOCD: CPT | Mod: CPTII,,, | Performed by: STUDENT IN AN ORGANIZED HEALTH CARE EDUCATION/TRAINING PROGRAM

## 2024-05-06 PROCEDURE — 73130 X-RAY EXAM OF HAND: CPT | Mod: TC,RT

## 2024-05-06 PROCEDURE — 20526 THER INJECTION CARP TUNNEL: CPT | Mod: S$PBB,LT,, | Performed by: STUDENT IN AN ORGANIZED HEALTH CARE EDUCATION/TRAINING PROGRAM

## 2024-05-06 PROCEDURE — 1159F MED LIST DOCD IN RCRD: CPT | Mod: CPTII,,, | Performed by: STUDENT IN AN ORGANIZED HEALTH CARE EDUCATION/TRAINING PROGRAM

## 2024-05-06 PROCEDURE — 3074F SYST BP LT 130 MM HG: CPT | Mod: CPTII,,, | Performed by: STUDENT IN AN ORGANIZED HEALTH CARE EDUCATION/TRAINING PROGRAM

## 2024-05-06 PROCEDURE — 3079F DIAST BP 80-89 MM HG: CPT | Mod: CPTII,,, | Performed by: STUDENT IN AN ORGANIZED HEALTH CARE EDUCATION/TRAINING PROGRAM

## 2024-05-06 RX ORDER — LIDOCAINE HYDROCHLORIDE 10 MG/ML
1 INJECTION, SOLUTION EPIDURAL; INFILTRATION; INTRACAUDAL; PERINEURAL
Status: COMPLETED | OUTPATIENT
Start: 2024-05-06 | End: 2024-05-06

## 2024-05-06 RX ORDER — TRIAMCINOLONE ACETONIDE 40 MG/ML
40 INJECTION, SUSPENSION INTRA-ARTICULAR; INTRAMUSCULAR ONCE
Status: COMPLETED | OUTPATIENT
Start: 2024-05-06 | End: 2024-05-06

## 2024-05-06 RX ADMIN — TRIAMCINOLONE ACETONIDE 40 MG: 40 INJECTION, SUSPENSION INTRA-ARTICULAR; INTRAMUSCULAR at 02:05

## 2024-05-06 RX ADMIN — LIDOCAINE HYDROCHLORIDE 10 MG: 10 INJECTION, SOLUTION EPIDURAL; INFILTRATION; INTRACAUDAL; PERINEURAL at 02:05

## 2024-05-06 NOTE — PROGRESS NOTES
[facility] Orthopaedic Surgery Clinic Note    In brief, Traci Petersen is a 53 y.o. female left-hand dominant  presents to clinic today for evaluation of bilateral hand pain.  History of right carpal tunnel release 3 years ago.  Endorses a lot of stiffness weak  in the right hand.  Also endorses some occasional numbness in the fingers.  Left hand also causes her some numbness tingling decreased  strength and weakness.      PMH:   Past Medical History:   Diagnosis Date    Acquired absence of both cervix and uterus 01/02/1989    Carpal tunnel syndrome     High cholesterol     HTN (hypertension)     Type 2 diabetes mellitus, without long-term current use of insulin 10/25/2022    Lab Results  Component  Value  Date     HGBA1C  6.0  05/02/2023     Metformin, continue  Last foot: 10/3/23  Fundus: June 2023  Microalbumin: June 2023       PSH:   Past Surgical History:   Procedure Laterality Date    CARPAL TUNNEL RELEASE Right     CHOLECYSTECTOMY      HYSTERECTOMY      TONSILLECTOMY      TUBAL LIGATION         SH:   Social History     Socioeconomic History    Marital status:    Tobacco Use    Smoking status: Never    Smokeless tobacco: Never   Substance and Sexual Activity    Alcohol use: Yes     Comment: OCC    Drug use: No    Sexual activity: Yes     Social Determinants of Health     Transportation Needs: No Transportation Needs (5/20/2021)    [facility], [facility]    PRAPARE - Transportation     Lack of Transportation (Medical): No     Lack of Transportation (Non-Medical): No       FH:   Family History   Problem Relation Name Age of Onset    No Known Problems Mother      No Known Problems Father         Allergies: Review of patient's allergies indicates:  No Known Allergies    ROS:  Constitutional- no fever, fatigue, weakness  Eye- no vision loss, eye redness, drainage, or pain  ENMT- no sore throat, ear pain, sinus pain/congestion, nasal congestion/drainage  Respiratory- no cough,  wheezing, or shortness of breath  CV- no chest pain, no palpitations, no edema  GI- no N/V/D; no abdominal pain    Physical Exam:  Vitals:    05/06/24 1317   BP: 122/81   Pulse: 71   Temp: 97.6 °F (36.4 °C)       General: NAD  Cardio: RRR by peripheral pulse  Pulm: Normal WOB on room air, symmetric chest rise  Abd: Soft, NT/ND    MSK:  Bilateral hand:  New line right hand surgical incision clean dry intact no evidence of infection   Patient unable to make a full composite fist, stiffness in her fingers  On the right side she has got a positive Durkan's and on the left side she has a positive Durkan's as well   Negative Tinel's/flexion compression at the elbow   Neurovascularly intact    Imaging:   none    Assessment:  53-year-old female with bilateral carpal tunnel syndrome    -I discussed with the patient I think the biggest issue for her right hand is her significant stiffness, she has about a cm or so away from making a full composite fist.  I will send her to occupational therapy work on range of motion in bilateral hands.  In regards to the left hand, her EMG demonstrated moderate carpal tunnel syndrome I think she would benefit from a steroid injection.  We will give him 1 today.  Follow up as needed    Mitch Braun MD  [facility]  5/6/2024 1:55 PM        A steroid injection was performed at [facility] using 1% plain Lidocaine and 40 mg of Kenalog. This was well tolerated.

## 2024-05-06 NOTE — PROGRESS NOTES
Faculty Attestation: Traci Petersen  was seen at [facility] in the Orthopaedic Clinic. Patient seen and evaluated at the time of the visit. History of Present Illness, Physical Exam, and Assessment and Plan reviewed. Treatment plan is reasonable and appropriate. Compliance with treatment recommendations is important. Procedure note reviewed. Present for entire procedure with the resident. Patient tolerated procedure well.      Rodgre Rodriguez MD  Orthopaedic Surgery

## 2024-06-25 DIAGNOSIS — E55.9 VITAMIN D DEFICIENCY: ICD-10-CM

## 2024-06-25 RX ORDER — ERGOCALCIFEROL 1.25 MG/1
50000 CAPSULE ORAL
Qty: 12 CAPSULE | Refills: 0 | Status: SHIPPED | OUTPATIENT
Start: 2024-06-25

## 2024-07-01 DIAGNOSIS — I10 PRIMARY HYPERTENSION: ICD-10-CM

## 2024-07-01 RX ORDER — HYDROCHLOROTHIAZIDE 12.5 MG/1
12.5 TABLET ORAL DAILY
Qty: 30 TABLET | Refills: 11 | Status: SHIPPED | OUTPATIENT
Start: 2024-07-01

## 2024-08-15 ENCOUNTER — OFFICE VISIT (OUTPATIENT)
Dept: URGENT CARE | Facility: CLINIC | Age: 54
End: 2024-08-15
Payer: MEDICAID

## 2024-08-15 VITALS
SYSTOLIC BLOOD PRESSURE: 94 MMHG | OXYGEN SATURATION: 99 % | HEART RATE: 67 BPM | TEMPERATURE: 98 F | WEIGHT: 182.75 LBS | DIASTOLIC BLOOD PRESSURE: 62 MMHG | RESPIRATION RATE: 20 BRPM | HEIGHT: 61 IN | BODY MASS INDEX: 34.5 KG/M2

## 2024-08-15 DIAGNOSIS — M25.512 ACUTE PAIN OF LEFT SHOULDER: Primary | ICD-10-CM

## 2024-08-15 DIAGNOSIS — M54.6 ACUTE BILATERAL THORACIC BACK PAIN: ICD-10-CM

## 2024-08-15 PROCEDURE — 99213 OFFICE O/P EST LOW 20 MIN: CPT | Mod: S$PBB,,,

## 2024-08-15 PROCEDURE — 99214 OFFICE O/P EST MOD 30 MIN: CPT | Mod: PBBFAC

## 2024-08-15 PROCEDURE — 63600175 PHARM REV CODE 636 W HCPCS

## 2024-08-15 RX ORDER — CYCLOBENZAPRINE HCL 5 MG
5 TABLET ORAL 3 TIMES DAILY PRN
Qty: 15 TABLET | Refills: 0 | Status: SHIPPED | OUTPATIENT
Start: 2024-08-15 | End: 2024-08-20

## 2024-08-15 RX ORDER — KETOROLAC TROMETHAMINE 30 MG/ML
60 INJECTION, SOLUTION INTRAMUSCULAR; INTRAVENOUS
Status: COMPLETED | OUTPATIENT
Start: 2024-08-15 | End: 2024-08-15

## 2024-08-15 RX ADMIN — KETOROLAC TROMETHAMINE 60 MG: 30 INJECTION, SOLUTION INTRAMUSCULAR at 04:08

## 2024-08-15 NOTE — PROGRESS NOTES
"Subjective:       Patient ID: Traci Petersen is a 53 y.o. female.    Vitals:  height is 5' 1" (1.549 m) and weight is 82.9 kg (182 lb 12.2 oz). Her temperature is 98.4 °F (36.9 °C). Her blood pressure is 94/62 and her pulse is 67. Her respiration is 20 and oxygen saturation is 99%.     Chief Complaint: Pain (RT upper, posterior rib area x 2 days, no injury noted)    53-year-old female reports to clinic with complaints right upper back pain and left shoulder pain that began about 2 days ago.  Patient reports that she works at [employer] and does not recall lifting anything heavy at work.  Patient can not recall any injuries that happened.  Patient states that pain keeps her from sleeping at night and wakes her up at night.  Patient states she has been taking over-the-counter ibuprofen with little relief.  Patient reports generalized joint pain as well but reports that she has a history of arthritis.  Patient states that sitting in a warm bath tub provides mild relief, but the relief is only temporary.  Patient denies headache, dizziness, nausea, vomiting, diarrhea, abdominal pain, dysuria.    All other systems are negative    Chart reviewed    Objective:   Physical Exam   Constitutional: She appears well-developed.  Non-toxic appearance. She does not appear ill. No distress.   Cardiovascular: Normal heart sounds.   Abdominal: Normal appearance and bowel sounds are normal.   Musculoskeletal:      Right shoulder: Normal.      Left shoulder: She exhibits decreased range of motion, tenderness and decreased strength.      Thoracic back: She exhibits decreased range of motion, tenderness and spasm.      Lumbar back: Normal.   Neurological: no focal deficit. She is alert. She displays no weakness.   Skin: Skin is not diaphoretic. Capillary refill takes less than 2 seconds.   Psychiatric: Her behavior is normal. Mood normal.   Nursing note and vitals reviewed.      Assessment:     1. Acute pain of left shoulder    2. " Acute bilateral thoracic back pain            Plan:   Please read patient education material.  Please take medications as discussed and consider potential side effects. Consider heat, over-the-counter topical analgesics, stretches.    Return to urgent care if symptoms are not improving in 3-5 days, immediately if new or worsening symptoms develop.  Follow up with your primary provider.       Acute pain of left shoulder  -     ketorolac injection 60 mg  -     cyclobenzaprine (FLEXERIL) 5 MG tablet; Take 1 tablet (5 mg total) by mouth 3 (three) times daily as needed for Muscle spasms.  Dispense: 15 tablet; Refill: 0    Acute bilateral thoracic back pain        Please note: This chart was completed via voice to text dictation. It may contain typographical/word recognition errors. If there are any questions, please contact the provider for final clarification.

## 2024-08-15 NOTE — PATIENT INSTRUCTIONS
Please read patient education material.  Please take medications as discussed and consider potential side effects. Consider heat, over-the-counter topical analgesics, stretches.    Return to urgent care if symptoms are not improving in 3-5 days, immediately if new or worsening symptoms develop.  Follow up with your primary provider.

## 2024-08-29 ENCOUNTER — OFFICE VISIT (OUTPATIENT)
Dept: FAMILY MEDICINE | Facility: CLINIC | Age: 54
End: 2024-08-29
Payer: MEDICAID

## 2024-08-29 ENCOUNTER — CLINICAL SUPPORT (OUTPATIENT)
Dept: FAMILY MEDICINE | Facility: CLINIC | Age: 54
End: 2024-08-29
Attending: NURSE PRACTITIONER
Payer: MEDICAID

## 2024-08-29 VITALS
SYSTOLIC BLOOD PRESSURE: 129 MMHG | RESPIRATION RATE: 18 BRPM | BODY MASS INDEX: 34.42 KG/M2 | DIASTOLIC BLOOD PRESSURE: 84 MMHG | TEMPERATURE: 98 F | OXYGEN SATURATION: 100 % | WEIGHT: 182.31 LBS | HEART RATE: 66 BPM | HEIGHT: 61 IN

## 2024-08-29 DIAGNOSIS — F99 INSOMNIA DUE TO OTHER MENTAL DISORDER: ICD-10-CM

## 2024-08-29 DIAGNOSIS — F41.9 ANXIETY: ICD-10-CM

## 2024-08-29 DIAGNOSIS — Z12.11 COLON CANCER SCREENING: ICD-10-CM

## 2024-08-29 DIAGNOSIS — Z12.31 ENCOUNTER FOR SCREENING MAMMOGRAM FOR MALIGNANT NEOPLASM OF BREAST: ICD-10-CM

## 2024-08-29 DIAGNOSIS — E78.5 HYPERLIPIDEMIA, UNSPECIFIED HYPERLIPIDEMIA TYPE: ICD-10-CM

## 2024-08-29 DIAGNOSIS — G56.03 CARPAL TUNNEL SYNDROME, BILATERAL: ICD-10-CM

## 2024-08-29 DIAGNOSIS — E55.9 VITAMIN D DEFICIENCY: ICD-10-CM

## 2024-08-29 DIAGNOSIS — I10 HYPERTENSION, UNSPECIFIED TYPE: ICD-10-CM

## 2024-08-29 DIAGNOSIS — F51.05 INSOMNIA DUE TO OTHER MENTAL DISORDER: ICD-10-CM

## 2024-08-29 DIAGNOSIS — I10 PRIMARY HYPERTENSION: Primary | ICD-10-CM

## 2024-08-29 DIAGNOSIS — E11.42 TYPE 2 DIABETES MELLITUS WITH DIABETIC POLYNEUROPATHY, WITHOUT LONG-TERM CURRENT USE OF INSULIN: ICD-10-CM

## 2024-08-29 DIAGNOSIS — G89.29 CHRONIC RIGHT SHOULDER PAIN: ICD-10-CM

## 2024-08-29 DIAGNOSIS — E11.42 DIABETIC POLYNEUROPATHY ASSOCIATED WITH TYPE 2 DIABETES MELLITUS: ICD-10-CM

## 2024-08-29 DIAGNOSIS — M25.511 CHRONIC RIGHT SHOULDER PAIN: ICD-10-CM

## 2024-08-29 DIAGNOSIS — E78.2 MIXED HYPERLIPIDEMIA: ICD-10-CM

## 2024-08-29 PROBLEM — E11.49 TYPE 2 DIABETES MELLITUS WITH NEUROLOGIC COMPLICATION, WITHOUT LONG-TERM CURRENT USE OF INSULIN: Status: ACTIVE | Noted: 2024-08-29

## 2024-08-29 PROBLEM — N94.10 DYSPAREUNIA, FEMALE: Status: RESOLVED | Noted: 2023-10-03 | Resolved: 2024-08-29

## 2024-08-29 LAB
CREAT UR-MCNC: 154.5 MG/DL (ref 45–106)
HBA1C MFR BLD: 6 %
MICROALBUMIN UR-MCNC: 8.9 UG/ML
MICROALBUMIN/CREAT RATIO PNL UR: 5.8 MG/GM CR (ref 0–30)

## 2024-08-29 PROCEDURE — 83036 HEMOGLOBIN GLYCOSYLATED A1C: CPT | Mod: PBBFAC,PN | Performed by: NURSE PRACTITIONER

## 2024-08-29 PROCEDURE — 3044F HG A1C LEVEL LT 7.0%: CPT | Mod: CPTII,,, | Performed by: NURSE PRACTITIONER

## 2024-08-29 PROCEDURE — 99214 OFFICE O/P EST MOD 30 MIN: CPT | Mod: S$PBB,,, | Performed by: NURSE PRACTITIONER

## 2024-08-29 PROCEDURE — 82570 ASSAY OF URINE CREATININE: CPT | Performed by: NURSE PRACTITIONER

## 2024-08-29 PROCEDURE — 99215 OFFICE O/P EST HI 40 MIN: CPT | Mod: PBBFAC,PN | Performed by: NURSE PRACTITIONER

## 2024-08-29 PROCEDURE — 3008F BODY MASS INDEX DOCD: CPT | Mod: CPTII,,, | Performed by: NURSE PRACTITIONER

## 2024-08-29 PROCEDURE — 82043 UR ALBUMIN QUANTITATIVE: CPT | Performed by: NURSE PRACTITIONER

## 2024-08-29 PROCEDURE — 3074F SYST BP LT 130 MM HG: CPT | Mod: CPTII,,, | Performed by: NURSE PRACTITIONER

## 2024-08-29 PROCEDURE — 1160F RVW MEDS BY RX/DR IN RCRD: CPT | Mod: CPTII,,, | Performed by: NURSE PRACTITIONER

## 2024-08-29 PROCEDURE — 3079F DIAST BP 80-89 MM HG: CPT | Mod: CPTII,,, | Performed by: NURSE PRACTITIONER

## 2024-08-29 PROCEDURE — 1159F MED LIST DOCD IN RCRD: CPT | Mod: CPTII,,, | Performed by: NURSE PRACTITIONER

## 2024-08-29 RX ORDER — METHOCARBAMOL 750 MG/1
750 TABLET, FILM COATED ORAL 3 TIMES DAILY
Qty: 90 TABLET | Refills: 6 | Status: SHIPPED | OUTPATIENT
Start: 2024-08-29

## 2024-08-29 RX ORDER — AMLODIPINE BESYLATE 10 MG/1
10 TABLET ORAL DAILY
Qty: 90 TABLET | Refills: 1 | Status: SHIPPED | OUTPATIENT
Start: 2024-08-29

## 2024-08-29 RX ORDER — ATORVASTATIN CALCIUM 40 MG/1
40 TABLET, FILM COATED ORAL NIGHTLY
Qty: 90 TABLET | Refills: 1 | Status: SHIPPED | OUTPATIENT
Start: 2024-08-29

## 2024-08-29 RX ORDER — ESCITALOPRAM OXALATE 20 MG/1
20 TABLET ORAL DAILY
Qty: 90 TABLET | Refills: 1 | Status: SHIPPED | OUTPATIENT
Start: 2024-08-29

## 2024-08-29 RX ORDER — DICLOFENAC SODIUM 75 MG/1
75 TABLET, DELAYED RELEASE ORAL 2 TIMES DAILY
Qty: 60 TABLET | Refills: 3 | Status: SHIPPED | OUTPATIENT
Start: 2024-08-29

## 2024-08-29 RX ORDER — GABAPENTIN 300 MG/1
300 CAPSULE ORAL 3 TIMES DAILY
Qty: 90 CAPSULE | Refills: 11 | Status: SHIPPED | OUTPATIENT
Start: 2024-08-29 | End: 2025-08-29

## 2024-08-29 RX ORDER — METFORMIN HYDROCHLORIDE 500 MG/1
500 TABLET ORAL 2 TIMES DAILY WITH MEALS
Qty: 180 TABLET | Refills: 1 | Status: SHIPPED | OUTPATIENT
Start: 2024-08-29

## 2024-08-29 RX ORDER — HYDROCHLOROTHIAZIDE 12.5 MG/1
12.5 TABLET ORAL DAILY
Qty: 30 TABLET | Refills: 11 | Status: SHIPPED | OUTPATIENT
Start: 2024-08-29

## 2024-08-29 NOTE — ASSESSMENT & PLAN NOTE
Lexapro 20mg po daily, refills sent today    Obtain counseling resources, list given for patient to find.    Practice deep breathing or abdominal breathing exercises when anxiety occurs.  Exercise daily. Get sunlight daily.  Avoid caffeine, alcohol and stimulants.  Practice positive phrases and repeat throughout the day, yoga, lavender scents or Chamomile tea will help anxiety.  Set healthy boundaries, avoid people and conversations that increase stress.  Reports any symptoms of suicidal or homicidal ideations immediately, if clinic is closed go to nearest emergency room.

## 2024-08-29 NOTE — ASSESSMENT & PLAN NOTE
Chronic, stable problem.   Stressed importance of dietary modifications. Follow a low cholesterol, low saturated fat diet with less that 200mg of cholesterol a day.  Avoid fried foods and high saturated fats (high saturated fats less than 7% of calories).  Add Flax Seed/Fish Oil supplements to diet. Increase dietary fiber.  Regular exercise can reduce LDL and raise HDL. Stressed importance of physical activity 5 times per week for 30 minutes per day.     \

## 2024-08-29 NOTE — ASSESSMENT & PLAN NOTE
Pain continues despite PT and conservative management with NSAIDs.  Needs refill of Diclofenac.    Refer to Ortho for continued shoulder pain. Referred in January, unable to contact her.

## 2024-08-29 NOTE — PROGRESS NOTES
Patient Name: Traci Petersen     : 1970    MRN: [id number]     Subjective:     Patient ID: Traci Petersen is a 54 y.o. female.    Chief Complaint:   Chief Complaint   Patient presents with    Follow-up     F/u appointment. C/o neck, mid back, and bilateral hand pain x2 days. No injury reported. States hasn't tried any OTC meds for the pain. States ran out and needs refills on Diclofenac Rx.         HPI: 24:  F/u appointment. c/o neck, mid back, and bilateral hand pain x2 days. No injury reported. States hasn't tried any OTC meds for the pain. States ran out and needs refills on Diclofenac Rx. Recently treated in Urgent Care. Given Flexeril which did not help her pain. Is not taking Tylenol Arthritis or Diclofenac currently.     Vitamin D deficiency:  WNL in January.      Bilateral carpal tunnel:  Pt has seen ortho for this issue.  HX: She had surgery on right wrist for carpal tunnel release 1 year ago and is still having pain/issues.  Left wrist hurting. Had CSI injection in May. She is on Diclofenac for her wrist and needs refills.        c/o Foot pain left anterior foot and under foot-  Sees podiatry in [place].  She is asking for podiatrist in [place]- had appt in [place] but missed it. Encouraged to reschedule as we do not have resources in [place] for this specialty.  Hx type 2 DM, unsure of control.       Insomnia-  Lexapro 20 mg po daily.  States has improved insomnia with Lexapro.  Was not currently on medications for anxiety at last visit, had tried and failed Zoloft.  She is stable on Lexapro 20mg daily and takes Melatonin which helps her sleep.     Anxiety-  Lexapro was increased to 20mg last visit.  Improved.  Previously has tried and failed Zoloft only.      Type 2 DM-  A1C 5.8 last visit, microalbumin due was WNL last year. She is only on Metformin 500mg po BID with meals currently which has her diabetes well-controlled.  Due for Fundus today. Due for foot exam.      HLD-  FLP last done in January and WNL. Is on Lipitor.      HTN-  Chronic, stable today.  Amlodipine refilled today. HCTZ refilled.   /84, is compliant with BP medications.    Health Maintenance-  States did Cologard but still stating she needs CRC  She is due for MMG, last one in January 2023.  Did not know she has to do this yearly.    PAP UTD                    ROS:      Review of Systems   Musculoskeletal:  Positive for back pain, joint pain, myalgias and neck pain. Negative for falls.   Skin: Negative.    Psychiatric/Behavioral: Negative.     All other systems reviewed and are negative.           History:     Past Medical History:   Diagnosis Date    Acquired absence of both cervix and uterus 01/02/1989    Carpal tunnel syndrome     Dyspareunia, female 10/03/2023    High cholesterol     HTN (hypertension)     Type 2 diabetes mellitus, without long-term current use of insulin 10/25/2022    Lab Results  Component  Value  Date     HGBA1C  6.0  05/02/2023     Metformin, continue  Last foot: 10/3/23  Fundus: June 2023  Microalbumin: June 2023        Past Surgical History:   Procedure Laterality Date    CARPAL TUNNEL RELEASE Right     CHOLECYSTECTOMY      HYSTERECTOMY      TONSILLECTOMY      TUBAL LIGATION         Family History   Problem Relation Name Age of Onset    No Known Problems Mother      No Known Problems Father          Social History     Tobacco Use    Smoking status: Never    Smokeless tobacco: Never   Substance and Sexual Activity    Alcohol use: Yes     Comment: OCC    Drug use: No    Sexual activity: Yes       Current Outpatient Medications   Medication Instructions    amLODIPine (NORVASC) 10 mg, Oral, Daily    atorvastatin (LIPITOR) 40 mg, Oral, Nightly    blood-glucose meter Misc 1 each, Other, 2 times daily    diclofenac (VOLTAREN) 75 mg, Oral, 2 times daily    ergocalciferol (ERGOCALCIFEROL) 50,000 Units, Oral, Every 7 days    EScitalopram oxalate (LEXAPRO) 20 mg, Oral, Daily    gabapentin  "(NEURONTIN) 300 mg, Oral, 3 times daily    hydroCHLOROthiazide (HYDRODIURIL) 12.5 mg, Oral, Daily    metFORMIN (GLUCOPHAGE) 500 mg, Oral, 2 times daily with meals    methocarbamoL (ROBAXIN) 750 mg, Oral, 3 times daily        Review of patient's allergies indicates:  No Known Allergies    Objective:     Visit Vitals  /84 (BP Location: Left arm, Patient Position: Sitting)   Pulse 66   Temp 97.7 °F (36.5 °C) (Oral)   Resp 18   Ht 5' 1" (1.549 m)   Wt 82.7 kg (182 lb 4.8 oz)   SpO2 100%   BMI 34.45 kg/m²       Physical Examination:     Physical Exam  Vitals reviewed.   Constitutional:       Appearance: Normal appearance. She is normal weight.   HENT:      Head: Normocephalic.   Cardiovascular:      Rate and Rhythm: Normal rate and regular rhythm.      Pulses: Normal pulses.      Heart sounds: Normal heart sounds.   Pulmonary:      Effort: Pulmonary effort is normal.      Breath sounds: Normal breath sounds.   Abdominal:      General: Abdomen is flat.      Palpations: Abdomen is soft.   Musculoskeletal:         General: Normal range of motion.      Right shoulder: Tenderness present.      Left shoulder: Tenderness present.      Right upper arm: Tenderness present.      Left upper arm: Tenderness present.      Right wrist: Tenderness present.      Left wrist: Tenderness present.      Right hand: Tenderness present.      Left hand: Tenderness present.      Cervical back: Normal range of motion. Tenderness present.      Thoracic back: Tenderness present.      Lumbar back: Tenderness present.   Skin:     General: Skin is warm and dry.   Neurological:      Mental Status: She is alert.   Psychiatric:         Mood and Affect: Mood normal.         Lab Results:     Chemistry:  Lab Results   Component Value Date     01/31/2024    K 3.8 01/31/2024    BUN 10.2 01/31/2024    CREATININE 0.66 01/31/2024    EGFRNORACEVR >60 01/31/2024    GLUCOSE 143 (H) 01/31/2024    CALCIUM 9.1 01/31/2024    ALKPHOS 75 01/31/2024    LABPROT " 7.5 01/31/2024    ALBUMIN 3.8 01/31/2024    AST 17 01/31/2024    ALT 13 01/31/2024    LUWGEGJY56VY 33.1 01/31/2024    TSH 1.427 01/31/2024    BSFCCX0KBLA 0.89 01/31/2024        Lab Results   Component Value Date    HGBA1C 5.8 01/31/2024        Hematology:  Lab Results   Component Value Date    WBC 6.31 01/31/2024    HGB 12.6 01/31/2024    HCT 40.2 01/31/2024     01/31/2024       Lipid Panel:  Lab Results   Component Value Date    CHOL 207 (H) 01/31/2024    HDL 63 (H) 01/31/2024    .00 01/31/2024    TRIG 120 01/31/2024    TOTALCHOLEST 3 01/31/2024        Urine:  Lab Results   Component Value Date    APPEARANCEUA Turbid (A) 01/31/2024    SGUA 1.031 (H) 01/31/2024    PROTEINUA 1+ (A) 01/31/2024    KETONESUA Negative 01/31/2024    LEUKOCYTESUR 25 (A) 01/31/2024    RBCUA 0-5 01/31/2024    WBCUA 0-5 01/31/2024    BACTERIA Trace (A) 01/31/2024    SQEPUA Many (A) 01/31/2024    HYALINECASTS 0-2 (A) 01/31/2024    CREATRANDUR 169.1 (H) 05/02/2023        Assessment:          ICD-10-CM ICD-9-CM   1. Primary hypertension  I10 401.9   2. Chronic right shoulder pain  M25.511 719.41    G89.29 338.29   3. Carpal tunnel syndrome, bilateral  G56.03 354.0   4. Mixed hyperlipidemia  E78.2 272.2   5. Diabetic polyneuropathy associated with type 2 diabetes mellitus  E11.42 250.60     357.2   6. Type 2 diabetes mellitus with diabetic polyneuropathy, without long-term current use of insulin  E11.42 250.60     357.2   7. Encounter for screening mammogram for malignant neoplasm of breast  Z12.31 V76.12   8. Colon cancer screening  Z12.11 V76.51   9. Hypertension, unspecified type  I10 401.9   10. Hyperlipidemia, unspecified hyperlipidemia type  E78.5 272.4   11. Anxiety  F41.9 300.00   12. Vitamin D deficiency  E55.9 268.9   13. Insomnia due to other mental disorder  F51.05 300.9    F99 327.02   14. BMI 33.0-33.9,adult  Z68.33 V85.33        Plan:     1. Primary hypertension  Overview:  BP Readings from Last 3 Encounters:    08/29/24 129/84   08/15/24 94/62   05/06/24 122/81         Assessment & Plan:  Stable on meds, chronic problem  Low Sodium Diet (Dash Diet - less than 2 grams of sodium per day).  Monitor Blood Pressure daily and log. Report any consistent numbers greater than 140/90.  Smoking Cessation encouraged to aid in BP reduction.  Maintain healthy weight with goal BMI <30. Exercise 30 minutes per day 5 days per week      Orders:  -     hydroCHLOROthiazide (HYDRODIURIL) 12.5 MG Tab; Take 1 tablet (12.5 mg total) by mouth once daily.  Dispense: 30 tablet; Refill: 11    2. Chronic right shoulder pain  Assessment & Plan:  Pain continues despite PT and conservative management with NSAIDs.  Needs refill of Diclofenac.    Refer to Ortho for continued shoulder pain. Referred in January, unable to contact her.      Orders:  -     diclofenac (VOLTAREN) 75 MG EC tablet; Take 1 tablet (75 mg total) by mouth 2 (two) times daily.  Dispense: 60 tablet; Refill: 3  -     methocarbamoL (ROBAXIN) 750 MG Tab; Take 1 tablet (750 mg total) by mouth 3 (three) times daily.  Dispense: 90 tablet; Refill: 6  -     Ambulatory referral/consult to Orthopedics; Future; Expected date: 09/05/2024    3. Carpal tunnel syndrome, bilateral  Overview:  Formatting of this note might be different from the original.  Added automatically from request for surgery [id number]    Assessment & Plan:  Bilateral wrist splints not helping. Pain in right hand continues. Pain in left hand continues. Pt has seen orthopedics for CTS and had a CSI in left hand last visit which was in May. She had no documented FU appointment. She was instructed to call that clinic today to make an appointment for evaluation.  She has had surgery on right for CTS in past.      Pt is on Diclofenac already and we increased her Gabapentin last visit. Rx sent for refills of Diclofenac today. Instructed Tylenol Arthritis TID today also. Robaxin sent for muscle spasms, right and left shoulder pain and  back pain.    Orders:  -     diclofenac (VOLTAREN) 75 MG EC tablet; Take 1 tablet (75 mg total) by mouth 2 (two) times daily.  Dispense: 60 tablet; Refill: 3  -     methocarbamoL (ROBAXIN) 750 MG Tab; Take 1 tablet (750 mg total) by mouth 3 (three) times daily.  Dispense: 90 tablet; Refill: 6    4. Mixed hyperlipidemia  Overview:  Lab Results   Component Value Date    CHOL 207 (H) 01/31/2024    CHOL 186 05/02/2023     Lab Results   Component Value Date    HDL 63 (H) 01/31/2024    HDL 66 (H) 05/02/2023     No results found for: LDLCALC  No results found for: DLDL  Lab Results   Component Value Date    TRIG 120 01/31/2024    TRIG 103 05/02/2023       f1 No results found for: CHOLHDL      Assessment & Plan:  Chronic, stable problem.   Stressed importance of dietary modifications. Follow a low cholesterol, low saturated fat diet with less that 200mg of cholesterol a day.  Avoid fried foods and high saturated fats (high saturated fats less than 7% of calories).  Add Flax Seed/Fish Oil supplements to diet. Increase dietary fiber.  Regular exercise can reduce LDL and raise HDL. Stressed importance of physical activity 5 times per week for 30 minutes per day.     \      5. Diabetic polyneuropathy associated with type 2 diabetes mellitus  Overview:        Assessment & Plan:  Pt has routine FU with Podiatry in [place]     We did discuss today that this is neuropathy in feet just like hands and that she is on the right medication for the type of pain she is experiencing.      Orders:  -     gabapentin (NEURONTIN) 300 MG capsule; Take 1 capsule (300 mg total) by mouth 3 (three) times daily.  Dispense: 90 capsule; Refill: 11    6. Type 2 diabetes mellitus with diabetic polyneuropathy, without long-term current use of insulin  Overview:  Lab Results   Component Value Date    HGBA1C 5.8 01/31/2024     Last foot exam: 8/29/24  Last eye exam: 8/29/24  Last micro albumin: 8/29/24    Assessment & Plan:  Metformin Refilled today, rx  sent.  Encouraged ACE/ARB/Statin according to guidelines.  Follow ADA Diet. Avoid soda, simple sweets, and limit rice/pasta/breads/starches.  Maintain healthy weight with goal BMI <30. Exercise 5 times per week for 30 minutes per day.  Stressed importance of daily foot exams.  Stressed importance of annual dilated eye exam.        Orders:  -     Diabetic Eye Screening Photo; Future  -     MICROALBUMIN / CREATININE RATIO URINE  -     POCT HEMOGLOBIN A1C  -     metFORMIN (GLUCOPHAGE) 500 MG tablet; Take 1 tablet (500 mg total) by mouth 2 (two) times daily with meals.  Dispense: 180 tablet; Refill: 1    7. Encounter for screening mammogram for malignant neoplasm of breast  -     Mammo Digital Screening Bilat; Future; Expected date: 08/29/2024    8. Colon cancer screening  -     Ambulatory referral/consult to Endo Procedure ; Future; Expected date: 08/30/2024    9. Hypertension, unspecified type  Overview:  BP Readings from Last 3 Encounters:   08/29/24 129/84   08/15/24 94/62   05/06/24 122/81         Assessment & Plan:  Stable on meds, chronic problem  Low Sodium Diet (Dash Diet - less than 2 grams of sodium per day).  Monitor Blood Pressure daily and log. Report any consistent numbers greater than 140/90.  Smoking Cessation encouraged to aid in BP reduction.  Maintain healthy weight with goal BMI <30. Exercise 30 minutes per day 5 days per week      Orders:  -     amLODIPine (NORVASC) 10 MG tablet; Take 1 tablet (10 mg total) by mouth once daily.  Dispense: 90 tablet; Refill: 1    10. Hyperlipidemia, unspecified hyperlipidemia type  Overview:  Lab Results   Component Value Date    CHOL 207 (H) 01/31/2024    CHOL 186 05/02/2023     Lab Results   Component Value Date    HDL 63 (H) 01/31/2024    HDL 66 (H) 05/02/2023     No results found for: LDLCALC  No results found for: DLDL  Lab Results   Component Value Date    TRIG 120 01/31/2024    TRIG 103 05/02/2023       f1 No results found for:  CHOLHDL      Assessment & Plan:  Chronic, stable problem.   Stressed importance of dietary modifications. Follow a low cholesterol, low saturated fat diet with less that 200mg of cholesterol a day.  Avoid fried foods and high saturated fats (high saturated fats less than 7% of calories).  Add Flax Seed/Fish Oil supplements to diet. Increase dietary fiber.  Regular exercise can reduce LDL and raise HDL. Stressed importance of physical activity 5 times per week for 30 minutes per day.     \    Orders:  -     atorvastatin (LIPITOR) 40 MG tablet; Take 1 tablet (40 mg total) by mouth every evening.  Dispense: 90 tablet; Refill: 1    11. Anxiety  Assessment & Plan:  Lexapro 20mg po daily, refills sent today    Obtain counseling resources, list given for patient to find.    Practice deep breathing or abdominal breathing exercises when anxiety occurs.  Exercise daily. Get sunlight daily.  Avoid caffeine, alcohol and stimulants.  Practice positive phrases and repeat throughout the day, yoga, lavender scents or Chamomile tea will help anxiety.  Set healthy boundaries, avoid people and conversations that increase stress.  Reports any symptoms of suicidal or homicidal ideations immediately, if clinic is closed go to nearest emergency room.        Orders:  -     EScitalopram oxalate (LEXAPRO) 20 MG tablet; Take 1 tablet (20 mg total) by mouth once daily.  Dispense: 90 tablet; Refill: 1    12. Vitamin D deficiency  Assessment & Plan:  Vitamin D in January was WNL        13. Insomnia due to other mental disorder  Assessment & Plan:  Continue Lexapro, take Melatonin.  Avoid caffeine, alcohol and stimulants. Do not use illicit drugs.  Practice positive phrases and repeat throughout the day.  Try yoga, lavender scents or Chamomile tea to promote relaxation.  Set healthy boundaries, avoid people and conversations that increase stress.  Avoid caffeinated beverages after lunch  Avoid alcohol near bedtime (eg, late afternoon and  evening)  Avoid smoking or other nicotine intake, particularly during the evening  Exercise regularly for at least 20 minutes, preferably more than four to five hours prior to bedtime  Avoid daytime naps, especially if they are longer than 20 to 30 minutes or occur late in the day  Resolve concerns or worries before bedtime  Try not to force sleep    Sleep Hygiene Techniques: Sleep hygiene refers to actions that tend to improve and maintain good sleep  Power down electronic devices at least one hour prior to bedtime.  Keep room dark; use eye mask or relaxation sound machine to promote rest.  Sleep as long as necessary to feel rested (usually seven to eight hours for adults) and then get out of bed  Maintain a regular sleep schedule, particularly a regular wake-up time in the morning        14. BMI 33.0-33.9,adult  Overview:  Wt Readings from Last 3 Encounters:   08/29/24 0742 82.7 kg (182 lb 4.8 oz)   08/15/24 1613 82.9 kg (182 lb 12.2 oz)   05/06/24 1317 83.7 kg (184 lb 9.6 oz)         Assessment & Plan:  Stable  BMI Body mass index is 34.45 kg/m².   Goal BMI <30.  Exercise 5 times a week for 30 minutes per day.  Avoid soda, simple sugars, excessive rice, potatoes or bread. Limit fast foods and fried foods.  Choose complex carbs in moderation (example: green vegetables, beans, oatmeal). Eat plenty of fresh fruits and vegetables with lean meats daily.  Do not skip meals. Eat a balanced portion size.  Avoid fad diets. Consider permanent healthy life style changes.                Follow up in about 6 months (around 2/28/2025) for Wellness; HTN, HLD, CTS, [place] pain, Arthritis, Insomnia, DM.    No future appointments.     CRISTINO Diaz

## 2024-08-29 NOTE — ASSESSMENT & PLAN NOTE
Pt has routine FU with Podiatry in [place]     We did discuss today that this is neuropathy in feet just like hands and that she is on the right medication for the type of pain she is experiencing.

## 2024-08-29 NOTE — ASSESSMENT & PLAN NOTE
Stable  BMI Body mass index is 34.45 kg/m².   Goal BMI <30.  Exercise 5 times a week for 30 minutes per day.  Avoid soda, simple sugars, excessive rice, potatoes or bread. Limit fast foods and fried foods.  Choose complex carbs in moderation (example: green vegetables, beans, oatmeal). Eat plenty of fresh fruits and vegetables with lean meats daily.  Do not skip meals. Eat a balanced portion size.  Avoid fad diets. Consider permanent healthy life style changes.

## 2024-08-29 NOTE — ASSESSMENT & PLAN NOTE
Bilateral wrist splints not helping. Pain in right hand continues. Pain in left hand continues. Pt has seen orthopedics for CTS and had a CSI in left hand last visit which was in May. She had no documented FU appointment. She was instructed to call that clinic today to make an appointment for evaluation.  She has had surgery on right for CTS in past.      Pt is on Diclofenac already and we increased her Gabapentin last visit. Rx sent for refills of Diclofenac today. Instructed Tylenol Arthritis TID today also. Robaxin sent for muscle spasms, right and left shoulder pain and back pain.

## 2024-08-29 NOTE — ASSESSMENT & PLAN NOTE
Metformin Refilled today, rx sent.  Encouraged ACE/ARB/Statin according to guidelines.  Follow ADA Diet. Avoid soda, simple sweets, and limit rice/pasta/breads/starches.  Maintain healthy weight with goal BMI <30. Exercise 5 times per week for 30 minutes per day.  Stressed importance of daily foot exams.  Stressed importance of annual dilated eye exam.

## 2024-09-06 ENCOUNTER — HOSPITAL ENCOUNTER (OUTPATIENT)
Dept: RADIOLOGY | Facility: HOSPITAL | Age: 54
Discharge: HOME | End: 2024-09-06
Attending: NURSE PRACTITIONER
Payer: MEDICAID

## 2024-09-06 DIAGNOSIS — Z12.31 ENCOUNTER FOR SCREENING MAMMOGRAM FOR MALIGNANT NEOPLASM OF BREAST: ICD-10-CM

## 2024-09-06 PROCEDURE — 77067 SCR MAMMO BI INCL CAD: CPT | Mod: TC

## 2024-09-19 DIAGNOSIS — E55.9 VITAMIN D DEFICIENCY: ICD-10-CM

## 2024-09-19 DIAGNOSIS — I10 PRIMARY HYPERTENSION: ICD-10-CM

## 2024-09-20 ENCOUNTER — OFFICE VISIT (OUTPATIENT)
Dept: ORTHOPEDICS | Facility: CLINIC | Age: 54
End: 2024-09-20
Payer: MEDICAID

## 2024-09-20 VITALS
HEIGHT: 61 IN | SYSTOLIC BLOOD PRESSURE: 130 MMHG | WEIGHT: 184 LBS | BODY MASS INDEX: 34.74 KG/M2 | DIASTOLIC BLOOD PRESSURE: 84 MMHG | TEMPERATURE: 98 F

## 2024-09-20 DIAGNOSIS — G56.03 CARPAL TUNNEL SYNDROME, BILATERAL: Primary | ICD-10-CM

## 2024-09-20 PROCEDURE — 99213 OFFICE O/P EST LOW 20 MIN: CPT | Mod: PBBFAC

## 2024-09-20 RX ORDER — LIDOCAINE HYDROCHLORIDE 10 MG/ML
1 INJECTION, SOLUTION EPIDURAL; INFILTRATION; INTRACAUDAL; PERINEURAL
Status: COMPLETED | OUTPATIENT
Start: 2024-09-20 | End: 2024-09-20

## 2024-09-20 RX ORDER — TRIAMCINOLONE ACETONIDE 40 MG/ML
40 INJECTION, SUSPENSION INTRA-ARTICULAR; INTRAMUSCULAR ONCE
Status: COMPLETED | OUTPATIENT
Start: 2024-09-20 | End: 2024-09-20

## 2024-09-20 RX ADMIN — LIDOCAINE HYDROCHLORIDE 10 MG: 10 INJECTION, SOLUTION EPIDURAL; INFILTRATION; INTRACAUDAL; PERINEURAL at 10:09

## 2024-09-20 RX ADMIN — TRIAMCINOLONE ACETONIDE 40 MG: 40 INJECTION, SUSPENSION INTRA-ARTICULAR; INTRAMUSCULAR at 10:09

## 2024-09-20 NOTE — TELEPHONE ENCOUNTER
----- Message from [facility] sent at 9/20/2024  9:52 AM CDT -----  Regarding: refill  .Type:  RX Refill Request    Who Called:  pt    Refill or New Rx: refill    RX Name and Strength:  VITAMIN D2 1,250 mcg (50,000 unit) capsule (Discontinued) 12 capsule 0 8/18/2023 12/19/2023 No  Sig - Route: TAKE ONE CAPSULE BY MOUTH ONCE WEEKLY      How is the patient currently taking it? (ex. 1XDay): one week    Is this a 30 day or 90 day RX:     Preferred Pharmacy with phone number:  [id number] at [address]    Phone: [phone]  Fax: [phone]        Local or Mail Order: local    Ordering Provider: Fanny    Would the patient rather a call back or a response via [facility]?      Best Call Back Number: [phone]    Additional Information:  refill also pt states she can not sleep at night she would like  something to help her sleep at night thanks

## 2024-09-20 NOTE — PROGRESS NOTES
Faculty Attestation: Traci Petersen  was seen at [facility] in the Orthopaedic Clinic. Discussed with the resident at the time of the visit. History of Present Illness, Physical Exam, and Assessment and Plan reviewed. Treatment plan is reasonable and appropriate. Compliance with treatment recommendations is important. No procedure was performed.     John Campa MD  Orthopaedic Surgery

## 2024-09-20 NOTE — PROGRESS NOTES
[facility] Clinic Note    In brief, Traci Petersen is a 54 y.o. female left-hand dominant  presents to clinic today for evaluation of bilateral hand pain.  History of right carpal tunnel release 3 years ago.  Endorses a lot of stiffness weak  in the right hand.  We referred her to occupational therapy at her last visit which she attended and states she did not get much improvement in the motion in her right hand.  Left hand also causes her numbness tingling decreased  strength and weakness.  She received a corticosteroid injection to the left carpal tunnel at the last visit which he states provided good relief for about 2 weeks.  States she is not interested in surgery at this time because she does not want to miss work.      PMH:   Past Medical History:   Diagnosis Date    Acquired absence of both cervix and uterus 01/02/1989    Carpal tunnel syndrome     Dyspareunia, female 10/03/2023    High cholesterol     HTN (hypertension)     Type 2 diabetes mellitus, without long-term current use of insulin 10/25/2022    Lab Results  Component  Value  Date     HGBA1C  6.0  05/02/2023     Metformin, continue  Last foot: 10/3/23  Fundus: June 2023  Microalbumin: June 2023       PSH:   Past Surgical History:   Procedure Laterality Date    CARPAL TUNNEL RELEASE Right     CHOLECYSTECTOMY      HYSTERECTOMY      TONSILLECTOMY      TUBAL LIGATION         SH:   Social History     Socioeconomic History    Marital status:    Tobacco Use    Smoking status: Never    Smokeless tobacco: Never   Substance and Sexual Activity    Alcohol use: Yes     Comment: OCC    Drug use: No    Sexual activity: Yes     Social Determinants of Health     Transportation Needs: No Transportation Needs (5/20/2021)    Received from [facility], [facility]    PRAPARE - Transportation     Lack of Transportation (Medical): No     Lack of Transportation (Non-Medical): No       FH:   Family History   Problem Relation Name  Age of Onset    No Known Problems Mother      No Known Problems Father         Allergies: Review of patient's allergies indicates:  No Known Allergies    ROS:  Constitutional- no fever, fatigue, weakness  Eye- no vision loss, eye redness, drainage, or pain  ENMT- no sore throat, ear pain, sinus pain/congestion, nasal congestion/drainage  Respiratory- no cough, wheezing, or shortness of breath  CV- no chest pain, no palpitations, no edema  GI- no N/V/D; no abdominal pain    Physical Exam:  Vitals:    09/20/24 0734   BP: 130/84   Temp: 98 °F (36.7 °C)       General: NAD  Cardio: RRR by peripheral pulse  Pulm: Normal WOB on room air, symmetric chest rise  Abd: Soft, NT/ND    MSK:  Bilateral hand:   right hand surgical incision clean dry intact no evidence of infection   She does have stiffness in the fingers of the right hand and requires assistance in making a full composite fist.  On the right side she has got a positive Durkan's and on the left side she has a positive Durkan's as well   Negative Tinel's/flexion compression at the elbow   Neurovascularly intact    Imaging:   X-ray bilateral hands from 05/06/2024 reviewed, diffuse degenerative changes without acute osseous abnormality.    Assessment:  53-year-old female s/p right carpal tunnel release 3 years ago with right hand stiffness.  Also having left carpal tunnel syndrome.    -I discussed with the patient I think the biggest issue for her right hand is her significant stiffness, she is making improvements with occupational therapy a still has hand stiffness.  Continue occupational therapy work on range of motion in bilateral hands.  In regards to the left hand, her EMG demonstrated moderate carpal tunnel syndrome I think she would benefit from a steroid injection.  We will give her 1 today.  Follow up as needed if she would like to discuss surgery.    Procedure note:   Informed consent was obtained to perform a left carpal tunnel corticosteroid injection.  The  left carpal tunnel was prepped sterilely and injected with 1 cc of lidocaine 1% and 1 cc of Kenalog using the standard volar approach.  Patient tolerated this well.    Osvaldo Aguilar MD  [facility]  9/20/2024 1:55 PM

## 2024-09-21 RX ORDER — HYDROCHLOROTHIAZIDE 12.5 MG/1
12.5 TABLET ORAL DAILY
Qty: 30 TABLET | Refills: 11 | Status: SHIPPED | OUTPATIENT
Start: 2024-09-21

## 2024-09-21 RX ORDER — ERGOCALCIFEROL 1.25 MG/1
50000 CAPSULE ORAL
Qty: 12 CAPSULE | Refills: 0 | Status: SHIPPED | OUTPATIENT
Start: 2024-09-21 | End: 2024-09-26 | Stop reason: SDUPTHER

## 2024-09-26 DIAGNOSIS — E55.9 VITAMIN D DEFICIENCY: ICD-10-CM

## 2024-09-26 RX ORDER — ERGOCALCIFEROL 1.25 MG/1
50000 CAPSULE ORAL
Qty: 12 CAPSULE | Refills: 0 | Status: SHIPPED | OUTPATIENT
Start: 2024-09-26

## 2024-11-13 DIAGNOSIS — Z12.11 SCREEN FOR COLON CANCER: Primary | ICD-10-CM

## 2024-11-13 RX ORDER — POLYETHYLENE GLYCOL 3350, SODIUM SULFATE, SODIUM CHLORIDE, POTASSIUM CHLORIDE, SODIUM ASCORBATE, AND ASCORBIC ACID 7.5-2.691G
KIT ORAL
Qty: 1 KIT | Refills: 0 | Status: SHIPPED | OUTPATIENT
Start: 2024-11-13

## 2024-11-22 ENCOUNTER — OFFICE VISIT (OUTPATIENT)
Dept: ORTHOPEDICS | Facility: CLINIC | Age: 54
End: 2024-11-22
Payer: MEDICAID

## 2024-11-22 ENCOUNTER — HOSPITAL ENCOUNTER (OUTPATIENT)
Dept: RADIOLOGY | Facility: HOSPITAL | Age: 54
Discharge: HOME | End: 2024-11-22
Attending: STUDENT IN AN ORGANIZED HEALTH CARE EDUCATION/TRAINING PROGRAM
Payer: MEDICAID

## 2024-11-22 VITALS
WEIGHT: 181 LBS | TEMPERATURE: 87 F | SYSTOLIC BLOOD PRESSURE: 133 MMHG | BODY MASS INDEX: 34.17 KG/M2 | HEIGHT: 61 IN | DIASTOLIC BLOOD PRESSURE: 85 MMHG

## 2024-11-22 DIAGNOSIS — M25.511 CHRONIC RIGHT SHOULDER PAIN: ICD-10-CM

## 2024-11-22 DIAGNOSIS — M75.41 SUBACROMIAL IMPINGEMENT OF RIGHT SHOULDER: Primary | ICD-10-CM

## 2024-11-22 DIAGNOSIS — G89.29 CHRONIC RIGHT SHOULDER PAIN: ICD-10-CM

## 2024-11-22 DIAGNOSIS — M75.41 SUBACROMIAL IMPINGEMENT OF RIGHT SHOULDER: ICD-10-CM

## 2024-11-22 PROCEDURE — 99214 OFFICE O/P EST MOD 30 MIN: CPT | Mod: PBBFAC,25 | Performed by: STUDENT IN AN ORGANIZED HEALTH CARE EDUCATION/TRAINING PROGRAM

## 2024-11-22 PROCEDURE — 96372 THER/PROPH/DIAG INJ SC/IM: CPT | Mod: PBBFAC

## 2024-11-22 PROCEDURE — 73030 X-RAY EXAM OF SHOULDER: CPT | Mod: TC,RT

## 2024-11-22 RX ORDER — TRIAMCINOLONE ACETONIDE 40 MG/ML
40 INJECTION, SUSPENSION INTRA-ARTICULAR; INTRAMUSCULAR ONCE
Status: COMPLETED | OUTPATIENT
Start: 2024-11-22 | End: 2024-11-22

## 2024-11-22 RX ORDER — LIDOCAINE HYDROCHLORIDE 10 MG/ML
5 INJECTION, SOLUTION EPIDURAL; INFILTRATION; INTRACAUDAL; PERINEURAL
Status: COMPLETED | OUTPATIENT
Start: 2024-11-22 | End: 2024-11-22

## 2024-11-22 RX ADMIN — TRIAMCINOLONE ACETONIDE 40 MG: 40 INJECTION, SUSPENSION INTRA-ARTICULAR; INTRAMUSCULAR at 11:11

## 2024-11-22 RX ADMIN — LIDOCAINE HYDROCHLORIDE 50 MG: 10 INJECTION, SOLUTION EPIDURAL; INFILTRATION; INTRACAUDAL; PERINEURAL at 11:11

## 2024-12-06 ENCOUNTER — ANESTHESIA (OUTPATIENT)
Dept: ENDOSCOPY | Facility: HOSPITAL | Age: 54
End: 2024-12-06
Payer: MEDICAID

## 2024-12-06 ENCOUNTER — HOSPITAL ENCOUNTER (OUTPATIENT)
Facility: HOSPITAL | Age: 54
Discharge: HOME | End: 2024-12-06
Attending: INTERNAL MEDICINE | Admitting: INTERNAL MEDICINE
Payer: MEDICAID

## 2024-12-06 ENCOUNTER — ANESTHESIA EVENT (OUTPATIENT)
Dept: ENDOSCOPY | Facility: HOSPITAL | Age: 54
End: 2024-12-06
Payer: MEDICAID

## 2024-12-06 VITALS
HEART RATE: 55 BPM | RESPIRATION RATE: 18 BRPM | OXYGEN SATURATION: 100 % | HEIGHT: 62 IN | WEIGHT: 176.63 LBS | BODY MASS INDEX: 32.5 KG/M2 | DIASTOLIC BLOOD PRESSURE: 88 MMHG | SYSTOLIC BLOOD PRESSURE: 140 MMHG | TEMPERATURE: 99 F

## 2024-12-06 DIAGNOSIS — G56.03 CARPAL TUNNEL SYNDROME, BILATERAL: Primary | ICD-10-CM

## 2024-12-06 LAB — POCT GLUCOSE: 136 MG/DL (ref 70–110)

## 2024-12-06 PROCEDURE — 63600175 PHARM REV CODE 636 W HCPCS: Performed by: NURSE ANESTHETIST, CERTIFIED REGISTERED

## 2024-12-06 PROCEDURE — 45378 DIAGNOSTIC COLONOSCOPY: CPT | Performed by: INTERNAL MEDICINE

## 2024-12-06 PROCEDURE — 37000009 HC ANESTHESIA EA ADD 15 MINS: Performed by: INTERNAL MEDICINE

## 2024-12-06 PROCEDURE — 37000008 HC ANESTHESIA 1ST 15 MINUTES: Performed by: INTERNAL MEDICINE

## 2024-12-06 PROCEDURE — G0121 COLON CA SCRN NOT HI RSK IND: HCPCS | Performed by: INTERNAL MEDICINE

## 2024-12-06 PROCEDURE — 82962 GLUCOSE BLOOD TEST: CPT | Performed by: INTERNAL MEDICINE

## 2024-12-06 RX ORDER — SODIUM CHLORIDE, SODIUM LACTATE, POTASSIUM CHLORIDE, CALCIUM CHLORIDE 600; 310; 30; 20 MG/100ML; MG/100ML; MG/100ML; MG/100ML
INJECTION, SOLUTION INTRAVENOUS CONTINUOUS
Status: CANCELLED | OUTPATIENT
Start: 2024-12-06

## 2024-12-06 RX ORDER — PROPOFOL 10 MG/ML
INJECTION, EMULSION INTRAVENOUS
Status: DISCONTINUED | OUTPATIENT
Start: 2024-12-06 | End: 2024-12-06

## 2024-12-06 RX ORDER — LIDOCAINE HYDROCHLORIDE 20 MG/ML
INJECTION INTRAVENOUS
Status: DISCONTINUED | OUTPATIENT
Start: 2024-12-06 | End: 2024-12-06

## 2024-12-06 RX ADMIN — PROPOFOL 25 MG: 10 INJECTION, EMULSION INTRAVENOUS at 01:12

## 2024-12-06 RX ADMIN — LIDOCAINE HYDROCHLORIDE 50 MG: 20 INJECTION INTRAVENOUS at 01:12

## 2024-12-06 RX ADMIN — PROPOFOL 100 MG: 10 INJECTION, EMULSION INTRAVENOUS at 01:12

## 2024-12-06 NOTE — ANESTHESIA PREPROCEDURE EVALUATION
"                                                                                                             12/06/2024  Traci Petersen is a 54 y.o., female FOR CLN      Vitals:    12/06/24 1021   BP: (!) 145/75   BP Location: Right arm   Patient Position: Lying   Pulse: (!) 56   Resp: 16   Temp: 37.1 °C (98.8 °F)   TempSrc: Oral   SpO2: 100%   Weight: 80.1 kg (176 lb 9.6 oz)   Height: 5' 2" (1.575 m)       There were no vitals filed for this visit.      PMH OF HTN, HLD, ANXIETY    Active Ambulatory Problems     Diagnosis Date Noted    Carpal tunnel syndrome, bilateral 04/13/2021    HTN (hypertension) 10/25/2022    Hyperlipidemia 10/25/2022    Anxiety 05/02/2023    Insomnia due to other mental disorder 05/02/2023    Vitamin D deficiency 05/31/2023    BMI 33.0-33.9,adult 06/07/2023    Diabetic polyneuropathy associated with type 2 diabetes mellitus 06/30/2023    Chronic right shoulder pain 10/03/2023    Pelvic floor tension 10/31/2023    Type 2 diabetes mellitus with neurologic complication, without long-term current use of insulin 08/29/2024     Resolved Ambulatory Problems     Diagnosis Date Noted    Type 2 diabetes mellitus, without long-term current use of insulin 10/25/2022    Acquired absence of both cervix and uterus 01/02/1989    Dyspareunia, female 10/03/2023     Past Medical History:   Diagnosis Date    Carpal tunnel syndrome     High cholesterol        Past Surgical History:   Procedure Laterality Date    CARPAL TUNNEL RELEASE Right     CHOLECYSTECTOMY      HYSTERECTOMY      TONSILLECTOMY      TUBAL LIGATION           Lab Results   Component Value Date    WBC 6.31 01/31/2024    HGB 12.6 01/31/2024    HCT 40.2 01/31/2024     01/31/2024    CHOL 207 (H) 01/31/2024    TRIG 120 01/31/2024    HDL 63 (H) 01/31/2024    ALT 13 01/31/2024    AST 17 01/31/2024     01/31/2024    K 3.8 01/31/2024     01/31/2024    CREATININE 0.66 01/31/2024    BUN 10.2 01/31/2024    CO2 " 25 01/31/2024    TSH 1.427 01/31/2024    HGBA1C 5.8 01/31/2024             Pre-op Assessment    I have reviewed the Patient Summary Reports.     I have reviewed the Nursing Notes. I have reviewed the NPO Status.   I have reviewed the Medications.     Review of Systems  Anesthesia Hx:  No problems with previous Anesthesia   History of prior surgery of interest to airway management or planning:          Denies Family Hx of Anesthesia complications.    Denies Personal Hx of Anesthesia complications.                    Hematology/Oncology:  Hematology Normal   Oncology Normal                                   EENT/Dental:  EENT/Dental Normal           Cardiovascular:  Cardiovascular Normal                                              Pulmonary:  Pulmonary Normal                       Renal/:  Renal/ Normal                 Hepatic/GI:  Hepatic/GI Normal                    Musculoskeletal:  Musculoskeletal Normal                Neurological:  Neurology Normal                                      Endocrine:  Endocrine Normal            Dermatological:  Skin Normal    Psych:  Psychiatric Normal                  Physical Exam  General: Cooperative, Well nourished, Alert and Oriented    Airway:  Mallampati: II / III/ II  Mouth Opening: Normal  TM Distance: Normal  Tongue: Normal  Neck ROM: Normal ROM    Anesthesia Plan  Type of Anesthesia, risks & benefits discussed:    Anesthesia Type: MAC  Intra-op Monitoring Plan: Standard ASA Monitors  Post Op Pain Control Plan: IV/PO Opioids PRN  (medical reason for not using multimodal pain management)  Induction:  IV  Informed Consent: Informed consent signed with the Patient and all parties understand the risks and agree with anesthesia plan.  All questions answered. Patient consented to blood products? No  ASA Score: 2  Day of Surgery Review of History & Physical: H&P Update referred to the surgeon/provider.    Ready For Surgery From Anesthesia Perspective.   .

## 2024-12-06 NOTE — PROVATION PATIENT INSTRUCTIONS
Discharge Summary/Instructions after an Endoscopic Procedure  Patient Name: Traci Petersen  Patient MRN: [id number]  Patient YOB: 1970 Friday, December 6, 2024  APRIL Vazquez MD  Dear patient,  As a result of recent federal legislation (The Federal Cures Act), you may   receive lab or pathology results from your procedure in your [facility]   account before your physician is able to contact you. Your physician or   their representative will relay the results to you with their   recommendations at their soonest availability.  Thank you,  RESTRICTIONS:  During your procedure today, you received medications for sedation.  These   medications may affect your judgment, balance and coordination.  Therefore,   for 24 hours, you have the following restrictions:   - DO NOT drive a car, operate machinery, make legal/financial decisions,   sign important papers or drink alcohol.    ACTIVITY:  Today: no heavy lifting, straining or running due to procedural   sedation/anesthesia.  The following day: return to full activity including work.  DIET:  Eat and drink normally unless instructed otherwise.     TREATMENT FOR COMMON SIDE EFFECTS:  - Mild abdominal pain, nausea, belching, bloating or excessive gas:  rest,   eat lightly and use a heating pad.  - Sore Throat: treat with throat lozenges and/or gargle with warm salt   water.  - Because air was used during the procedure, expelling large amounts of air   from your rectum or belching is normal.  - If a bowel prep was taken, you may not have a bowel movement for 1-3 days.    This is normal.  SYMPTOMS TO WATCH FOR AND REPORT TO YOUR PHYSICIAN:  1. Abdominal pain or bloating, other than gas cramps.  2. Chest pain.  3. Back pain.  4. Signs of infection such as: chills or fever occurring within 24 hours   after the procedure.  5. Rectal bleeding, which would show as bright red, maroon, or black stools.   (A tablespoon of blood from the rectum is not serious, especially  if   hemorrhoids are present.)  6. Vomiting.  7. Weakness or dizziness.  GO DIRECTLY TO THE NEAREST EMERGENCY ROOM IF YOU HAVE ANY OF THE FOLLOWING:      Difficulty breathing              Chills and/or fever over 101 F   Persistent vomiting and/or vomiting blood   Severe abdominal pain   Severe chest pain   Black, tarry stools   Bleeding- more than one tablespoon   Any other symptom or condition that you feel may need urgent attention  Your doctor recommends these additional instructions:  If any biopsies were taken, your doctors clinic will contact you in 1 to 2   weeks with any results.  - Resume previous diet.   - Continue present medications.   - Discharge patient to home (via wheelchair).   - Repeat colonoscopy in 10 years for screening purposes.  For questions, problems or results please call your physician - APRIL Vazquez MD at Work:  [phone].  [facility] , EMERGENCY ROOM PHONE NUMBER: [phone]  IF A COMPLICATION OR EMERGENCY SITUATION ARISES AND YOU ARE UNABLE TO REACH   YOUR PHYSICIAN - GO DIRECTLY TO THE EMERGENCY ROOM.  APRIL Vazquez MD  12/6/2024 1:43:13 PM  This report has been verified and signed electronically.  Dear patient,  As a result of recent federal legislation (The Federal Cures Act), you may   receive lab or pathology results from your procedure in your [facility]   account before your physician is able to contact you. Your physician or   their representative will relay the results to you with their   recommendations at their soonest availability.  Thank you,  PROVATION

## 2024-12-06 NOTE — PLAN OF CARE
Patient awake and states ready to go home. Assisted up to side of bed and tolerated well. Able to dress self. Ready for discharge.

## 2024-12-06 NOTE — H&P
[facility]  Pre-OP H&P    SUBJECTIVE:  Traci Petersen is a 54 y.o. y/o female w/ PMHx of HTN, T2DM who presents today for screening Colonoscopy.     Patient's has not had a colonoscopy, did cologuard in Jan 2023 per chart review. No family hs of colon cancer     OBJECTIVE:  There were no vitals filed for this visit.    General: female in NAD. Not toxic appearing.   HENT: EOM intact.   Pulmonary: No respiratory distress. Effort normal.  Cardiovascular: Regular rate.   Abdomen: soft, non-tender, non-distended      PATHOLOGY:         ASSESSMENT/PLAN:    Traci Petersen is a 54 y.o. y/o female who presents today for above stated scheduled procedure     -All questions answered, patient consented   -Patient consented for screening colonoscopy         Gerard Roman MD   PGY-2   [employer] General Surgery

## 2024-12-06 NOTE — TRANSFER OF CARE
Anesthesia Transfer of Care Note    Patient: Traci Petersen    Procedure(s) Performed: Procedure(s) (LRB):  COLONOSCOPY (N/A)    Patient location: GI    Anesthesia Type: general    Post pain: adequate analgesia    Post assessment: no apparent anesthetic complications    Post vital signs: stable    Level of consciousness: sedated    Nausea/Vomiting: no nausea/vomiting    Complications: none    Transfer of care protocol was followedComments: Report to Venkata LUIS      Last vitals: 103/46 p 52 sb r 18 sat 100 fm t 36

## 2024-12-06 NOTE — ANESTHESIA POSTPROCEDURE EVALUATION
Anesthesia Post Evaluation    Patient: Traci Petersen    Procedure(s) Performed: Procedure(s) (LRB):  COLONOSCOPY (N/A)    Final Anesthesia Type: general      Patient location during evaluation: GI PACU  Patient participation: No - Unable to Participate, Sedation  Level of consciousness: lethargic, responds to stimulation and sedated  Post-procedure vital signs: reviewed and stable  Pain management: adequate    PONV status at discharge: No PONV  Anesthetic complications: no      Cardiovascular status: hemodynamically stable  Respiratory status: unassisted, spontaneous ventilation and room air  Hydration status: euvolemic  Follow-up not needed.              Vitals Value Taken Time   /75 12/06/24 1021   Temp 37.1 °C (98.8 °F) 12/06/24 1021   Pulse 56 12/06/24 1021   Resp 16 12/06/24 1021   SpO2 100 % 12/06/24 1021         No case tracking events are documented in the log.      Pain/Aiden Score: No data recorded

## 2025-01-23 NOTE — PLAN OF CARE
Patient awake and talking. No acute distress. Tolerating cranberry juice. Call bell in reach. Family at bedside. Dr. Vazquez spoke with patient after procedure in procedure room.    Routed to   Please help pt schedule appt for this Friday in SEQ office with Dr Oren ELLER L&D f/u

## 2025-01-24 NOTE — PROGRESS NOTES
"  Subjective:    Patient ID: Traci Petersen is a  54 y.o. female  who presented to [facility] for new visit..    Chief Complaint: Pain and Follow-up of the Right Shoulder    History of Present Illness:  Traci Petersen  presented today for  shoulder pain involving the right for the past 6 months. Pain is located at deltoid muscle. Quality of pain is described as aching and throbbing. Inciting event: none known.  Pain is aggravated by work at or above shoulder height . Night pain yes and if sleeps on affected side Patient has had no prior shoulder problems. Evaluation to date: PCP evaluation. Treatment to date: oral analgesics. E       Objective:      Physical Exam:    /85   Temp (!) 86.7 °F (30.4 °C)   Ht 5' 1" (1.549 m)   Wt 82.1 kg (181 lb)   BMI 34.20 kg/m²       Appearance:  Soft tissue swelling: Left: no Right: no  Effusion: Left:  Negative Right: Negative  Erythema: Left no Right: no  Ecchymosis: Left: no Right: no  Atrophy: Left: no Right: no  Scapular winging: Left: no Right: no    Palpation:  Shoulder Tenderness: Left: none  Right: lateral acromion, deltoid muscle    Range of motion:  Flexion (0-90): Left:  90 Right: 90  Abduction (0-180): Left:  180 Right: 120  External rotation (0-55): Left: 55 Right: 55  Internal rotation (0-45): Left: 45 Right: 45    Strength:  Abduction: Left: 5/5 Pain: no Right: 5/5 Pain: yes  External rotation: Left: 5/5 Pain: no Right: 5/5 Pain: no  Internal rotation: Left: 5/5 Pain: no Right: 5/5 Pain: no  Elbow flexion: Left: 5/5 Pain: no Right: 5/5 Pain: no  Elbow extension: Left: 5/5 Pain: no Right: 5/5 Pain: no    Special Tests:  Subacromial Impingement  Neer: Left: Negative Right: Positive  Vo: Left: Negative Right: Positive    AC Joint Arthritis:  Cross-body abduction: Left: Negative Right: Negative    Rotator Cuff Tear   Drop arm test: Left: Negative Right: Negative    AIN/PIN/Ulnar nerve: " Intact and symmetric    General appearance: NAD  Peripheral pulses: normal bilaterally   Reflexes: Left: Not performed Right: Not performed   Sensation: normal    Labs:  Last A1c: 6     Imaging:   Previous images reviewed.    Assessment:        Encounter Diagnoses   Code Name Primary?    M75.41 Subacromial impingement of right shoulder Yes    M25.511, G89.29 Chronic right shoulder pain         Plan:      Dx: right. rotator cuff tendinitis    Treatment Plan: Discussed with patient diagnosis and treatment recommendations.   Natural history and expected course discussed. Questions answered.  Educational material distributed.  Reduction in offending activity.  Gentle ROM exercises.  Rest, ice, compression, and elevation (RICE) therapy.  Plain film x-rays.  Shoulder injection. See procedure note.  Physical therapy referral.  Home physical therapy exercise handouts provided to patient.   Over the counter NSAID and/or tylenol provided you do not have contraindications such as but not limited to liver or kidney disease or uncontrolled blood pressure. If you're doctors have told you to to not take them based on your health, do not take them.   Imaging: radiological studies ordered and independently reviewed; discussed with patient; pending radiologist interpretation.   Procedure: Discussed CSI/VSI as treatment options; discussed injections as treatment options; since conservative measures did not improve symptoms patient consented for CSI today.  Therapy: Physical Therapy  Medication: START over-the-counter acetaminophen (Tylenol 1000 mg three times per day as needed). Please see your primary care physician for further refills.  RTC: PRN; call if any issues.         Large Joint Aspiration/Injection    Date/Time: 11/22/2024 8:10 AM    Performed by: Min Haynes MD  Authorized by: Min Haynes MD    Consent Done?:  Yes (Written)  Indications:  Pain  Site marked: the procedure site was marked    Timeout: prior to  procedure the correct patient, procedure, and site was verified      Local anesthesia used?: Yes    Local anesthetic:  Topical anesthetic    Details:  Needle Size:  21 G  Location:  Shoulder  Patient tolerance:  Patient tolerated the procedure well with no immediate complications         Risks:  Possible complications with the injection include bleeding, infection (.01%), tendon rupture, steroid flare, fat pad or soft tissue atrophy, skin depigmentation, allergic reaction to medications and vasovagal response. (steroid flare treatment is rest, ice, NSAIDs and resolves in 24-36 hours.)    Consent:  No absolute contraindications (cellulitis overlying joint, infection, lack of informed consent, allergy to injection medication, AVN protein or egg allergy for sodium hyaluronate, or history of steroid flare) or relative contraindications (uncontrolled DM2 A1c>10, coagulopathy, INR > 3.5, previous joint replacement or history of AVN).        Description:  The patient was prepped in normal sterile fashion use of chlorhexidine scrub and the appropriate and anatomic landmarks were identified with ultrasound.  Contents of syringe included: 5cc of 1% of lidocaine with 40mg of Kenalog     Post Procedure: Patient alert, and moving all extremities. ROM improved, pain decreased.  Good peripheral pulses, no signs of vascular compromise and range of motion intact.  Aftercare instructions were given to patient at time of discharge.  Relative rest for 3 days-avoiding excess activity.  Place ice on the area for 15 minutes every 4-6 hours. Patient may take Tylenol a 1000 mg b.i.d. or ibuprofen 600 mg t.i.d. for the next 3-4 days if not on medication already and safe to take pending co-morbidities.  Protect the area for the next 1-8 hours if anesthetic was used.  Avoid excessive activity for the next 3-4 weeks.  ER precautions given for fever, severe joint pain or allergic reaction or other new symptoms related to the joint injection.          This note is dictated using the M*Modal Fluency Direct word recognition program. There are word recognition mistakes that are occasionally missed on review.    Min Haynes MD

## 2025-02-07 ENCOUNTER — OFFICE VISIT (OUTPATIENT)
Dept: FAMILY MEDICINE | Facility: CLINIC | Age: 55
End: 2025-02-07
Payer: MEDICAID

## 2025-02-07 VITALS
HEIGHT: 62 IN | DIASTOLIC BLOOD PRESSURE: 82 MMHG | WEIGHT: 181 LBS | TEMPERATURE: 98 F | RESPIRATION RATE: 18 BRPM | SYSTOLIC BLOOD PRESSURE: 122 MMHG | OXYGEN SATURATION: 98 % | BODY MASS INDEX: 33.31 KG/M2 | HEART RATE: 80 BPM

## 2025-02-07 DIAGNOSIS — F99 INSOMNIA DUE TO OTHER MENTAL DISORDER: ICD-10-CM

## 2025-02-07 DIAGNOSIS — G89.29 CHRONIC RIGHT SHOULDER PAIN: ICD-10-CM

## 2025-02-07 DIAGNOSIS — E78.2 MIXED HYPERLIPIDEMIA: ICD-10-CM

## 2025-02-07 DIAGNOSIS — E11.42 DIABETIC POLYNEUROPATHY ASSOCIATED WITH TYPE 2 DIABETES MELLITUS: ICD-10-CM

## 2025-02-07 DIAGNOSIS — E55.9 VITAMIN D DEFICIENCY: ICD-10-CM

## 2025-02-07 DIAGNOSIS — Z00.00 ENCOUNTER FOR WELLNESS EXAMINATION: ICD-10-CM

## 2025-02-07 DIAGNOSIS — I10 PRIMARY HYPERTENSION: ICD-10-CM

## 2025-02-07 DIAGNOSIS — E11.42 TYPE 2 DIABETES MELLITUS WITH DIABETIC POLYNEUROPATHY, WITHOUT LONG-TERM CURRENT USE OF INSULIN: ICD-10-CM

## 2025-02-07 DIAGNOSIS — F51.05 INSOMNIA DUE TO OTHER MENTAL DISORDER: ICD-10-CM

## 2025-02-07 DIAGNOSIS — F41.9 ANXIETY: Primary | ICD-10-CM

## 2025-02-07 DIAGNOSIS — G56.03 CARPAL TUNNEL SYNDROME, BILATERAL: ICD-10-CM

## 2025-02-07 DIAGNOSIS — E78.5 HYPERLIPIDEMIA, UNSPECIFIED HYPERLIPIDEMIA TYPE: ICD-10-CM

## 2025-02-07 DIAGNOSIS — M25.511 CHRONIC RIGHT SHOULDER PAIN: ICD-10-CM

## 2025-02-07 LAB
25(OH)D3+25(OH)D2 SERPL-MCNC: 17 NG/ML (ref 30–80)
ALBUMIN SERPL-MCNC: 3.9 G/DL (ref 3.5–5)
ALBUMIN/GLOB SERPL: 1.1 RATIO (ref 1.1–2)
ALP SERPL-CCNC: 63 UNIT/L (ref 40–150)
ALT SERPL-CCNC: 10 UNIT/L (ref 0–55)
ANION GAP SERPL CALC-SCNC: 6 MEQ/L
AST SERPL-CCNC: 16 UNIT/L (ref 5–34)
BACTERIA #/AREA URNS AUTO: ABNORMAL /HPF
BASOPHILS # BLD AUTO: 0.02 X10(3)/MCL
BASOPHILS NFR BLD AUTO: 0.3 %
BILIRUB SERPL-MCNC: 0.6 MG/DL
BILIRUB UR QL STRIP.AUTO: NEGATIVE
BUN SERPL-MCNC: 7.7 MG/DL (ref 9.8–20.1)
CALCIUM SERPL-MCNC: 9.6 MG/DL (ref 8.4–10.2)
CHLORIDE SERPL-SCNC: 107 MMOL/L (ref 98–107)
CHOLEST SERPL-MCNC: 217 MG/DL
CHOLEST/HDLC SERPL: 4 {RATIO} (ref 0–5)
CLARITY UR: CLEAR
CO2 SERPL-SCNC: 26 MMOL/L (ref 22–29)
COLOR UR AUTO: YELLOW
CREAT SERPL-MCNC: 0.67 MG/DL (ref 0.55–1.02)
CREAT/UREA NIT SERPL: 11
EOSINOPHIL # BLD AUTO: 0.15 X10(3)/MCL (ref 0–0.9)
EOSINOPHIL NFR BLD AUTO: 2.2 %
ERYTHROCYTE [DISTWIDTH] IN BLOOD BY AUTOMATED COUNT: 12.1 % (ref 11.5–17)
EST. AVERAGE GLUCOSE BLD GHB EST-MCNC: 114 MG/DL
GFR SERPLBLD CREATININE-BSD FMLA CKD-EPI: >60 ML/MIN/1.73/M2
GLOBULIN SER-MCNC: 3.7 GM/DL (ref 2.4–3.5)
GLUCOSE SERPL-MCNC: 124 MG/DL (ref 74–100)
GLUCOSE UR QL STRIP: NORMAL
HAV IGM SERPL QL IA: NONREACTIVE
HBA1C MFR BLD: 5.6 %
HBV CORE IGM SERPL QL IA: NONREACTIVE
HBV SURFACE AG SERPL QL IA: NONREACTIVE
HCT VFR BLD AUTO: 41.1 % (ref 37–47)
HCV AB SERPL QL IA: NONREACTIVE
HDLC SERPL-MCNC: 59 MG/DL (ref 35–60)
HGB BLD-MCNC: 12.8 G/DL (ref 12–16)
HGB UR QL STRIP: ABNORMAL
HIV 1+2 AB+HIV1 P24 AG SERPL QL IA: NONREACTIVE
HYALINE CASTS #/AREA URNS LPF: ABNORMAL /LPF
IMM GRANULOCYTES # BLD AUTO: 0.02 X10(3)/MCL (ref 0–0.04)
IMM GRANULOCYTES NFR BLD AUTO: 0.3 %
KETONES UR QL STRIP: NEGATIVE
LDLC SERPL CALC-MCNC: 131 MG/DL (ref 50–140)
LEUKOCYTE ESTERASE UR QL STRIP: 25
LYMPHOCYTES # BLD AUTO: 3.05 X10(3)/MCL (ref 0.6–4.6)
LYMPHOCYTES NFR BLD AUTO: 44.5 %
MCH RBC QN AUTO: 27.6 PG (ref 27–31)
MCHC RBC AUTO-ENTMCNC: 31.1 G/DL (ref 33–36)
MCV RBC AUTO: 88.6 FL (ref 80–94)
MONOCYTES # BLD AUTO: 0.43 X10(3)/MCL (ref 0.1–1.3)
MONOCYTES NFR BLD AUTO: 6.3 %
MUCOUS THREADS URNS QL MICRO: ABNORMAL /LPF
NEUTROPHILS # BLD AUTO: 3.19 X10(3)/MCL (ref 2.1–9.2)
NEUTROPHILS NFR BLD AUTO: 46.4 %
NITRITE UR QL STRIP: NEGATIVE
NRBC BLD AUTO-RTO: 0 %
PH UR STRIP: 5.5 [PH]
PLATELET # BLD AUTO: 391 X10(3)/MCL (ref 130–400)
PMV BLD AUTO: 8.5 FL (ref 7.4–10.4)
POTASSIUM SERPL-SCNC: 3.8 MMOL/L (ref 3.5–5.1)
PROT SERPL-MCNC: 7.6 GM/DL (ref 6.4–8.3)
PROT UR QL STRIP: NEGATIVE
RBC # BLD AUTO: 4.64 X10(6)/MCL (ref 4.2–5.4)
RBC #/AREA URNS AUTO: ABNORMAL /HPF
SODIUM SERPL-SCNC: 139 MMOL/L (ref 136–145)
SP GR UR STRIP.AUTO: 1.02 (ref 1–1.03)
SQUAMOUS #/AREA URNS LPF: ABNORMAL /HPF
T PALLIDUM AB SER QL: NONREACTIVE
T4 FREE SERPL-MCNC: 0.96 NG/DL (ref 0.7–1.48)
TRIGL SERPL-MCNC: 135 MG/DL (ref 37–140)
TSH SERPL-ACNC: 1.48 UIU/ML (ref 0.35–4.94)
UROBILINOGEN UR STRIP-ACNC: NORMAL
VIT B12 SERPL-MCNC: 567 PG/ML (ref 213–816)
VLDLC SERPL CALC-MCNC: 27 MG/DL
WBC # BLD AUTO: 6.86 X10(3)/MCL (ref 4.5–11.5)
WBC #/AREA URNS AUTO: ABNORMAL /HPF

## 2025-02-07 PROCEDURE — 82607 VITAMIN B-12: CPT | Performed by: NURSE PRACTITIONER

## 2025-02-07 PROCEDURE — 3074F SYST BP LT 130 MM HG: CPT | Mod: CPTII,,, | Performed by: NURSE PRACTITIONER

## 2025-02-07 PROCEDURE — 83036 HEMOGLOBIN GLYCOSYLATED A1C: CPT | Performed by: NURSE PRACTITIONER

## 2025-02-07 PROCEDURE — 80074 ACUTE HEPATITIS PANEL: CPT | Performed by: NURSE PRACTITIONER

## 2025-02-07 PROCEDURE — 84439 ASSAY OF FREE THYROXINE: CPT | Performed by: NURSE PRACTITIONER

## 2025-02-07 PROCEDURE — 82306 VITAMIN D 25 HYDROXY: CPT | Performed by: NURSE PRACTITIONER

## 2025-02-07 PROCEDURE — 36415 COLL VENOUS BLD VENIPUNCTURE: CPT | Performed by: NURSE PRACTITIONER

## 2025-02-07 PROCEDURE — 85025 COMPLETE CBC W/AUTO DIFF WBC: CPT | Performed by: NURSE PRACTITIONER

## 2025-02-07 PROCEDURE — 99214 OFFICE O/P EST MOD 30 MIN: CPT | Mod: PBBFAC,PN | Performed by: NURSE PRACTITIONER

## 2025-02-07 PROCEDURE — 81001 URINALYSIS AUTO W/SCOPE: CPT | Performed by: NURSE PRACTITIONER

## 2025-02-07 PROCEDURE — 99214 OFFICE O/P EST MOD 30 MIN: CPT | Mod: 25,S$PBB,, | Performed by: NURSE PRACTITIONER

## 2025-02-07 PROCEDURE — 80061 LIPID PANEL: CPT | Performed by: NURSE PRACTITIONER

## 2025-02-07 PROCEDURE — 3008F BODY MASS INDEX DOCD: CPT | Mod: CPTII,,, | Performed by: NURSE PRACTITIONER

## 2025-02-07 PROCEDURE — 1160F RVW MEDS BY RX/DR IN RCRD: CPT | Mod: CPTII,,, | Performed by: NURSE PRACTITIONER

## 2025-02-07 PROCEDURE — 86780 TREPONEMA PALLIDUM: CPT | Performed by: NURSE PRACTITIONER

## 2025-02-07 PROCEDURE — 99396 PREV VISIT EST AGE 40-64: CPT | Mod: S$PBB,,, | Performed by: NURSE PRACTITIONER

## 2025-02-07 PROCEDURE — 87389 HIV-1 AG W/HIV-1&-2 AB AG IA: CPT | Performed by: NURSE PRACTITIONER

## 2025-02-07 PROCEDURE — 84443 ASSAY THYROID STIM HORMONE: CPT | Performed by: NURSE PRACTITIONER

## 2025-02-07 PROCEDURE — 3079F DIAST BP 80-89 MM HG: CPT | Mod: CPTII,,, | Performed by: NURSE PRACTITIONER

## 2025-02-07 PROCEDURE — 1159F MED LIST DOCD IN RCRD: CPT | Mod: CPTII,,, | Performed by: NURSE PRACTITIONER

## 2025-02-07 PROCEDURE — 80053 COMPREHEN METABOLIC PANEL: CPT | Performed by: NURSE PRACTITIONER

## 2025-02-07 RX ORDER — ERGOCALCIFEROL 1.25 MG/1
50000 CAPSULE ORAL
Qty: 12 CAPSULE | Refills: 0 | Status: SHIPPED | OUTPATIENT
Start: 2025-02-07

## 2025-02-07 RX ORDER — ESCITALOPRAM OXALATE 5 MG/1
5 TABLET ORAL DAILY
Qty: 90 TABLET | Refills: 3 | Status: SHIPPED | OUTPATIENT
Start: 2025-02-07 | End: 2026-02-07

## 2025-02-07 RX ORDER — ATORVASTATIN CALCIUM 80 MG/1
80 TABLET, FILM COATED ORAL NIGHTLY
Qty: 90 TABLET | Refills: 3 | Status: SHIPPED | OUTPATIENT
Start: 2025-02-07

## 2025-02-07 RX ORDER — DULOXETIN HYDROCHLORIDE 30 MG/1
30 CAPSULE, DELAYED RELEASE ORAL DAILY
Qty: 30 CAPSULE | Refills: 3 | Status: SHIPPED | OUTPATIENT
Start: 2025-03-17

## 2025-02-07 NOTE — PROGRESS NOTES
Please let patient know that her labs showed a decreased Vitamin D level.  This can lead to depression, fatigue and bone pain if too low and not supplemented.  We typically get Vitamin D from the sun and milk/dairy products.  On your own, you can increase foods high in Vitamin D such as fish oil, cod liver oil, salmon, milk fortified with vitamin D.     Today, a prescription for high dose Vitamin D3 51530 IU, to be taken weekly for 12 weeks, has been sent to your pharmacy. Please complete entire 12 weeks of Vitamin D prescription.    Please explain to her that her pharmacy will only dispense 4 vitamin-D capsules at 1 time but she needs to stay on the medication for 12 weeks so she will have to keep refilling the medicine after she completes the 1st for that are given to her.    Also, her cholesterol level was over 200.  She is currently taking Lipitor 40 mg.  We will need to increase her Lipitor to 80 mg.  I will send a new prescription to the pharmacy today she can pick that up and start taking it.  Please have her let us know if she has any undue side effects.    She also has a little bit of blood in her urine.  I am not sure where this is coming from.  We may need to repeat her urine in a couple of weeks to see if the blood is still present sometimes it will go away on its own.  I will place an order for a urinalysis if she can come back to the clinic in 2 weeks to get this done she can sign up for a nurse visit in the front and they will collect her urine.

## 2025-02-07 NOTE — ASSESSMENT & PLAN NOTE
Stable  BMI Body mass index is 33.11 kg/m².   Goal BMI <30.  Exercise 5 times a week for 30 minutes per day.  Avoid soda, simple sugars, excessive rice, potatoes or bread. Limit fast foods and fried foods.  Choose complex carbs in moderation (example: green vegetables, beans, oatmeal). Eat plenty of fresh fruits and vegetables with lean meats daily.  Do not skip meals. Eat a balanced portion size.  Avoid fad diets. Consider permanent healthy life style changes.

## 2025-02-07 NOTE — ASSESSMENT & PLAN NOTE
Wean Lexapro down, I gave her a paper listing how to do that.   Lexapro 5 mg po daily sent to pharmacy for her to use when weaning.  Avoid caffeine, alcohol and stimulants. Do not use illicit drugs.  Practice positive phrases and repeat throughout the day.  Try yoga, lavender scents or Chamomile tea to promote relaxation.  Set healthy boundaries, avoid people and conversations that increase stress.  Avoid caffeinated beverages after lunch  Avoid alcohol near bedtime (eg, late afternoon and evening)  Avoid smoking or other nicotine intake, particularly during the evening  Exercise regularly for at least 20 minutes, preferably more than four to five hours prior to bedtime  Avoid daytime naps, especially if they are longer than 20 to 30 minutes or occur late in the day  Resolve concerns or worries before bedtime  Try not to force sleep    Sleep Hygiene Techniques: Sleep hygiene refers to actions that tend to improve and maintain good sleep  Power down electronic devices at least one hour prior to bedtime.  Keep room dark; use eye mask or relaxation sound machine to promote rest.  Sleep as long as necessary to feel rested (usually seven to eight hours for adults) and then get out of bed  Maintain a regular sleep schedule, particularly a regular wake-up time in the morning

## 2025-02-07 NOTE — PROGRESS NOTES
I have sent medications and/or lab orders in for this patient.  Please notify the patient.     No orders of the defined types were placed in this encounter.      Medications Ordered This Encounter   Medications    atorvastatin (LIPITOR) 80 MG tablet     Sig: Take 1 tablet (80 mg total) by mouth every evening.     Dispense:  90 tablet     Refill:  3

## 2025-02-07 NOTE — ASSESSMENT & PLAN NOTE
Diclofenac.    Refer to Ortho for continued shoulder pain. Referred in January, unable to contact her.

## 2025-02-07 NOTE — ASSESSMENT & PLAN NOTE
Wean off of lexapro 20mg daily. Schedule given.   Rx lexapro 5 mg po daily to take when weaning.  Will start Cymbalta 30 mg po daily once she weans off of the lexapro. Set to start in March.    Obtain counseling resources, list given for patient to find.    Practice deep breathing or abdominal breathing exercises when anxiety occurs.  Exercise daily. Get sunlight daily.  Avoid caffeine, alcohol and stimulants.  Practice positive phrases and repeat throughout the day, yoga, lavender scents or Chamomile tea will help anxiety.  Set healthy boundaries, avoid people and conversations that increase stress.  Reports any symptoms of suicidal or homicidal ideations immediately, if clinic is closed go to nearest emergency room.

## 2025-02-07 NOTE — ASSESSMENT & PLAN NOTE
Metformin to continue  Encouraged ACE/ARB/Statin according to guidelines.  Follow ADA Diet. Avoid soda, simple sweets, and limit rice/pasta/breads/starches.  Maintain healthy weight with goal BMI <30. Exercise 5 times per week for 30 minutes per day.  Stressed importance of daily foot exams.  Stressed importance of annual dilated eye exam.

## 2025-02-07 NOTE — ASSESSMENT & PLAN NOTE
Pt has routine FU with Podiatry in [place]     We did discuss today that this is neuropathy in feet just like hands and that she is on the right medication for the type of pain she is experiencing.

## 2025-02-18 ENCOUNTER — TELEPHONE (OUTPATIENT)
Dept: FAMILY MEDICINE | Facility: CLINIC | Age: 55
End: 2025-02-18
Payer: MEDICAID

## 2025-02-18 NOTE — TELEPHONE ENCOUNTER
----- Message from Chris sent at 2/18/2025  8:04 AM CST -----  .Who Called: Traci Downs is requesting assistance/information from provider's office.Symptoms (please be specific): headaches How long has patient had these symptoms:  2 weeksList of preferred pharmacies on file (remove unneeded): [unfilled]If different, enter pharmacy into here including location and phone number: Preferred Method of Contact: Phone CallPatient's Preferred Phone Number on File: [phone] Best Call Back Number, if different:Additional Information:  pt called requesting tos peak with nurse

## 2025-02-28 ENCOUNTER — OFFICE VISIT (OUTPATIENT)
Dept: URGENT CARE | Facility: CLINIC | Age: 55
End: 2025-02-28
Payer: MEDICAID

## 2025-02-28 VITALS
SYSTOLIC BLOOD PRESSURE: 113 MMHG | HEART RATE: 70 BPM | WEIGHT: 175.38 LBS | OXYGEN SATURATION: 99 % | HEIGHT: 62 IN | RESPIRATION RATE: 18 BRPM | BODY MASS INDEX: 32.27 KG/M2 | DIASTOLIC BLOOD PRESSURE: 75 MMHG | TEMPERATURE: 98 F

## 2025-02-28 DIAGNOSIS — Z20.822 COVID-19 RULED OUT BY CLINICAL CRITERIA: ICD-10-CM

## 2025-02-28 DIAGNOSIS — J00 ACUTE NASOPHARYNGITIS: Primary | ICD-10-CM

## 2025-02-28 LAB
CTP QC/QA: YES
LEFT EYE DM RETINOPATHY: NEGATIVE
MOLECULAR STREP A: NEGATIVE
POC MOLECULAR INFLUENZA A AGN: NEGATIVE
POC MOLECULAR INFLUENZA B AGN: NEGATIVE
RIGHT EYE DM RETINOPATHY: NEGATIVE
SARS-COV-2 RDRP RESP QL NAA+PROBE: NEGATIVE

## 2025-02-28 PROCEDURE — 99214 OFFICE O/P EST MOD 30 MIN: CPT | Mod: PBBFAC

## 2025-02-28 RX ORDER — LORATADINE 10 MG/1
10 TABLET ORAL DAILY PRN
Qty: 30 TABLET | Refills: 0 | Status: SHIPPED | OUTPATIENT
Start: 2025-02-28 | End: 2025-03-30

## 2025-02-28 RX ORDER — PROMETHAZINE HYDROCHLORIDE AND DEXTROMETHORPHAN HYDROBROMIDE 6.25; 15 MG/5ML; MG/5ML
5 SYRUP ORAL NIGHTLY PRN
Qty: 120 ML | Refills: 0 | Status: SHIPPED | OUTPATIENT
Start: 2025-02-28

## 2025-02-28 RX ORDER — FLUTICASONE PROPIONATE 50 MCG
2 SPRAY, SUSPENSION (ML) NASAL DAILY
Qty: 16 G | Refills: 0 | Status: SHIPPED | OUTPATIENT
Start: 2025-02-28

## 2025-02-28 NOTE — PATIENT INSTRUCTIONS
An upper respiratory infection is also called a cold. It can affect your nose, throat, ears, and sinuses. Cold symptoms are usually worst for the first 3 to 5 days. Most people get better in 7 to 14 days. You may continue to cough for 2 to 3 weeks. Colds are caused by viruses and do not get better with antibiotics . Rest. Drink plenty of fluids. Tylenol or ibuprofen for any fever or aches.  Flonase and loratadine for nasal congestion and sinus symptoms.Cough medication may causes some drowsiness, no driving when taking. Return for any persistence, new, or worsening symptoms.Go to the nearest ER for worsening symptoms, such as severe pain, chest pain, shortness of breath, palpitations, etc.

## 2025-02-28 NOTE — PROGRESS NOTES
"Subjective:      Patient ID: Traci Petersen is a 54 y.o. female.    Vitals:  height is 5' 2" (1.575 m) and weight is 79.5 kg (175 lb 5.8 oz). Her temperature is 98.2 °F (36.8 °C). Her blood pressure is 113/75 and her pulse is 70. Her respiration is 18 and oxygen saturation is 99%.     Chief Complaint: Cough (Cough with chest pain x 2 days ) and Headache (Headaches x 3 weeks  )    CC as above.  Presents ongoing headache, body ache, sore throat, nasal congestion, and cough.  States chest discomfort when coughing.  Denies any shortness a breath or fever.  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     Cough  Associated symptoms include headaches, rhinorrhea and a sore throat. Pertinent negatives include no chest pain or shortness of breath.   Headache   Associated symptoms include coughing, rhinorrhea and a sore throat.   URI   This is a new problem. The current episode started 1 to 4 weeks ago. The problem has been unchanged. There has been no fever. The cough is Non-productive. Associated symptoms include congestion, coughing, headaches, joint pain, rhinorrhea, sinus pain and a sore throat. Pertinent negatives include no chest pain. She has tried acetaminophen for the symptoms. The treatment provided mild relief.       Constitution: Negative.   HENT:  Positive for congestion, sinus pain and sore throat.    Cardiovascular: Negative.  Negative for chest pain.   Respiratory:  Positive for cough. Negative for chest tightness and shortness of breath.    Musculoskeletal:  Positive for pain.   Neurological:  Positive for headaches.      Objective:     Physical Exam   Constitutional: She is oriented to person, place, and time. She appears well-developed. She is cooperative.  Non-toxic appearance. No distress. normalawake  HENT:   Head: Normocephalic and atraumatic.   Ears:   Right Ear: Hearing, external ear and ear canal normal. A middle ear effusion is present.   Left Ear: Hearing, external " ear and ear canal normal. A middle ear effusion is present.   Nose: Mucosal edema, rhinorrhea and congestion present. No nasal deformity. No epistaxis. Right sinus exhibits no maxillary sinus tenderness and no frontal sinus tenderness. Left sinus exhibits no maxillary sinus tenderness and no frontal sinus tenderness.   Mouth/Throat: Uvula is midline, oropharynx is clear and moist and mucous membranes are normal. Mucous membranes are moist. No trismus in the jaw. Normal dentition. No uvula swelling. No oropharyngeal exudate, posterior oropharyngeal edema or posterior oropharyngeal erythema. Oropharynx is clear.   Moderate nasal turbinate      Comments: Moderate nasal turbinate  Eyes: Conjunctivae and lids are normal. No scleral icterus.      Comments: Wearing sunscreen shades (she came from the eye doctor and had eyes dilated)   Neck: Trachea normal and phonation normal. Neck supple. No edema present. No erythema present. No neck rigidity present.   Cardiovascular: Normal rate, regular rhythm, normal heart sounds and normal pulses.   Pulmonary/Chest: Effort normal and breath sounds normal. No respiratory distress. She has no decreased breath sounds. She has no wheezes. She has no rhonchi.   Abdominal: Normal appearance.   Musculoskeletal: Normal range of motion.         General: No deformity. Normal range of motion.   Lymphadenopathy:     She has no cervical adenopathy.   Neurological: no focal deficit. She is alert and oriented to person, place, and time. She exhibits normal muscle tone. Coordination normal.   Skin: Skin is warm, dry, intact, not diaphoretic and not pale.   Psychiatric: Her speech is normal and behavior is normal. Mood normal.   Nursing note and vitals reviewed.      Assessment:     1. Acute nasopharyngitis    2. COVID-19 ruled out by clinical criteria      Results for orders placed or performed in visit on 02/28/25   POCT Strep A, Molecular    Collection Time: 02/28/25 12:26 PM   Result Value Ref  Range    Molecular Strep A, POC Negative Negative     Acceptable Yes    POCT Influenza A/B Molecular    Collection Time: 02/28/25 12:33 PM   Result Value Ref Range    POC Molecular Influenza A Ag Negative Negative    POC Molecular Influenza B Ag Negative Negative     Acceptable Yes    POCT COVID-19 Rapid Screening    Collection Time: 02/28/25 12:39 PM   Result Value Ref Range    POC Rapid COVID Negative Negative     Acceptable Yes          Plan:       Acute nasopharyngitis  -     POCT COVID-19 Rapid Screening  -     POCT Influenza A/B Molecular  -     POCT Strep A, Molecular  -     fluticasone propionate (FLONASE) 50 mcg/actuation nasal spray; 2 sprays (100 mcg total) by Each Nostril route once daily.  Dispense: 16 g; Refill: 0  -     loratadine (CLARITIN) 10 mg tablet; Take 1 tablet (10 mg total) by mouth daily as needed for Allergies.  Dispense: 30 tablet; Refill: 0  -     promethazine-dextromethorphan (PROMETHAZINE-DM) 6.25-15 mg/5 mL Syrp; Take 5 mLs by mouth nightly as needed (cough).  Dispense: 120 mL; Refill: 0    COVID-19 ruled out by clinical criteria    An upper respiratory infection is also called a cold. It can affect your nose, throat, ears, and sinuses. Cold symptoms are usually worst for the first 3 to 5 days. Most people get better in 7 to 14 days. You may continue to cough for 2 to 3 weeks. Colds are caused by viruses and do not get better with antibiotics . Rest. Drink plenty of fluids. Tylenol or ibuprofen for any fever or aches.  Flonase and loratadine for nasal congestion and sinus symptoms.Cough medication may causes some drowsiness, no driving when taking. Return for any persistence, new, or worsening symptoms.  ER precautions provided.

## 2025-03-21 ENCOUNTER — OFFICE VISIT (OUTPATIENT)
Dept: URGENT CARE | Facility: CLINIC | Age: 55
End: 2025-03-21
Payer: MEDICAID

## 2025-03-21 VITALS
OXYGEN SATURATION: 98 % | WEIGHT: 178.56 LBS | TEMPERATURE: 99 F | HEART RATE: 79 BPM | HEIGHT: 64 IN | BODY MASS INDEX: 30.48 KG/M2 | RESPIRATION RATE: 20 BRPM | DIASTOLIC BLOOD PRESSURE: 77 MMHG | SYSTOLIC BLOOD PRESSURE: 113 MMHG

## 2025-03-21 DIAGNOSIS — J02.8 BACTERIAL PHARYNGITIS: ICD-10-CM

## 2025-03-21 DIAGNOSIS — J02.9 SORE THROAT: Primary | ICD-10-CM

## 2025-03-21 DIAGNOSIS — B96.89 BACTERIAL PHARYNGITIS: ICD-10-CM

## 2025-03-21 LAB
CTP QC/QA: YES
MOLECULAR STREP A: NEGATIVE

## 2025-03-21 PROCEDURE — 99215 OFFICE O/P EST HI 40 MIN: CPT | Mod: PBBFAC

## 2025-03-21 RX ORDER — PROMETHAZINE HYDROCHLORIDE AND DEXTROMETHORPHAN HYDROBROMIDE 6.25; 15 MG/5ML; MG/5ML
5 SYRUP ORAL EVERY 4 HOURS PRN
Qty: 118 ML | Refills: 0 | Status: SHIPPED | OUTPATIENT
Start: 2025-03-21 | End: 2025-03-31

## 2025-03-21 RX ORDER — AMOXICILLIN AND CLAVULANATE POTASSIUM 875; 125 MG/1; MG/1
1 TABLET, FILM COATED ORAL EVERY 12 HOURS
Qty: 14 TABLET | Refills: 0 | Status: SHIPPED | OUTPATIENT
Start: 2025-03-21 | End: 2025-03-28

## 2025-03-21 NOTE — PROGRESS NOTES
"Subjective:       Patient ID: Traci Petersen is a 54 y.o. female.    Vitals:  height is 5' 4" (1.626 m) and weight is 81 kg (178 lb 9.2 oz). Her temperature is 98.7 °F (37.1 °C). Her blood pressure is 113/77 and her pulse is 79. Her respiration is 20 and oxygen saturation is 98%.     Chief Complaint: URI (Cough, sore throat x 1.5 wks)    54-year-old female reports to the clinic with complaints of cough, sore throat, and loss of voice that began about 1.5 weeks ago.  Patient states the cough is productive and she is coughing up brown to green mucus.  Patient denies fever, nausea, vomiting, abdominal pain, headache, chills, fatigue, body aches.  Patient reports that she is unable to sleep because of cough and that cough seems to be worsening at night.    All other systems are negative    Chart reviewed    Objective:   Physical Exam   Constitutional: She appears well-developed.  Non-toxic appearance. She does not appear ill. No distress.   HENT:   Head: Normocephalic and atraumatic.   Ears:   Right Ear: Hearing, tympanic membrane, external ear and ear canal normal.   Left Ear: Hearing, tympanic membrane, external ear and ear canal normal.   Nose: Mucosal edema, rhinorrhea and purulent discharge present. Right sinus exhibits no maxillary sinus tenderness and no frontal sinus tenderness. Left sinus exhibits no maxillary sinus tenderness and no frontal sinus tenderness.   Mouth/Throat: Uvula is midline. Oropharyngeal exudate, posterior oropharyngeal edema and posterior oropharyngeal erythema present.   Eyes: Right eye exhibits no discharge. Left eye exhibits no discharge. Extraocular movement intact   Neck: Neck supple. No neck rigidity present.   Cardiovascular: Regular rhythm.   Pulmonary/Chest: Effort normal and breath sounds normal. No respiratory distress. She has no wheezes. She has no rhonchi. She has no rales.   Lymphadenopathy:     She has no cervical adenopathy.   Neurological: She is alert.   Skin: " Skin is warm, dry and not diaphoretic.   Psychiatric: Her behavior is normal.   Nursing note and vitals reviewed.        Assessment:     1. Sore throat    2. Bacterial pharyngitis          Results for orders placed or performed in visit on 03/21/25   POCT Strep A, Molecular    Collection Time: 03/21/25  2:27 PM   Result Value Ref Range    Molecular Strep A, POC Negative Negative     Acceptable Yes        Plan:     Take medications as directed.  Discussed contagious precautions.      Please encourage fluids and use over-the-counter medications for symptoms as needed.  Monitor closely.  Please follow instructions on patient education material.  Return to urgent care in 2-3 days if symptoms are not improving. Seek care immediately if new or worsening symptoms develop.     Sore throat  -     POCT Strep A, Molecular  -     promethazine-dextromethorphan (PROMETHAZINE-DM) 6.25-15 mg/5 mL Syrp; Take 5 mLs by mouth every 4 (four) hours as needed (cough).  Dispense: 118 mL; Refill: 0  -     amoxicillin-clavulanate 875-125mg (AUGMENTIN) 875-125 mg per tablet; Take 1 tablet by mouth every 12 (twelve) hours. for 7 days  Dispense: 14 tablet; Refill: 0    Bacterial pharyngitis        Please note: This chart was completed via voice to text dictation. It may contain typographical/word recognition errors. If there are any questions, please contact the provider for final clarification.

## 2025-03-21 NOTE — LETTER
March 21, 2025      [facility]  [facility] [facility] [address]  Phone: [phone]       Patient: Traci Petersen   YOB: 1970  Date of Visit: 03/21/2025    To Whom It May Concern:    Davide Petersen  was at [facility] on 03/21/2025. The patient may return to work/school on 3/25/2025 with no restrictions. If you have any questions or concerns, or if I can be of further assistance, please do not hesitate to contact me.    Sincerely,    CRISTINO Terrazas

## 2025-04-03 ENCOUNTER — TELEPHONE (OUTPATIENT)
Dept: FAMILY MEDICINE | Facility: CLINIC | Age: 55
End: 2025-04-03
Payer: MEDICAID

## 2025-04-03 NOTE — TELEPHONE ENCOUNTER
----- Message from Claire sent at 4/3/2025  8:51 AM CDT -----  Regarding: med  .Type:  Needs Medical AdviceWho Called: ptSymptoms (please be specific):  How long has patient had these symptoms:  Pharmacy name and phone #:  Would the patient rather a call back or a response via [facility]? [facility] Call Back Number: [phone] Information: med side effect causing dizziness headaches

## 2025-04-15 ENCOUNTER — DOCUMENTATION ONLY (OUTPATIENT)
Dept: FAMILY MEDICINE | Facility: CLINIC | Age: 55
End: 2025-04-15

## 2025-06-12 DIAGNOSIS — F41.9 ANXIETY: ICD-10-CM

## 2025-06-12 RX ORDER — DULOXETIN HYDROCHLORIDE 30 MG/1
30 CAPSULE, DELAYED RELEASE ORAL DAILY
Qty: 30 CAPSULE | Refills: 3 | Status: SHIPPED | OUTPATIENT
Start: 2025-06-12

## 2025-06-17 ENCOUNTER — TELEPHONE (OUTPATIENT)
Dept: FAMILY MEDICINE | Facility: CLINIC | Age: 55
End: 2025-06-17
Payer: MEDICAID

## 2025-06-17 NOTE — TELEPHONE ENCOUNTER
Contacted patient to schedule next follow up; patient stated she will contact clinic when she's ready to schedule.

## 2025-08-19 DIAGNOSIS — E11.42 TYPE 2 DIABETES MELLITUS WITH DIABETIC POLYNEUROPATHY, WITHOUT LONG-TERM CURRENT USE OF INSULIN: ICD-10-CM

## 2025-08-20 RX ORDER — METFORMIN HYDROCHLORIDE 500 MG/1
500 TABLET ORAL 2 TIMES DAILY WITH MEALS
Qty: 180 TABLET | Refills: 3 | Status: SHIPPED | OUTPATIENT
Start: 2025-08-20

## (undated) DEVICE — KIT SURGICAL COLON .25 1.1OZ

## (undated) DEVICE — MANIFOLD 4 PORT